# Patient Record
Sex: FEMALE | Race: WHITE | NOT HISPANIC OR LATINO | Employment: FULL TIME | URBAN - METROPOLITAN AREA
[De-identification: names, ages, dates, MRNs, and addresses within clinical notes are randomized per-mention and may not be internally consistent; named-entity substitution may affect disease eponyms.]

---

## 2018-04-03 ENCOUNTER — HOSPITAL ENCOUNTER (OUTPATIENT)
Facility: HOSPITAL | Age: 33
Discharge: HOME | DRG: 384 | End: 2018-04-05
Attending: EMERGENCY MEDICINE | Admitting: EMERGENCY MEDICINE
Payer: MEDICAID

## 2018-04-03 DIAGNOSIS — D72.829 LEUKOCYTOSIS, UNSPECIFIED TYPE: ICD-10-CM

## 2018-04-03 DIAGNOSIS — R11.2 NON-INTRACTABLE VOMITING WITH NAUSEA, UNSPECIFIED VOMITING TYPE: ICD-10-CM

## 2018-04-03 DIAGNOSIS — K25.9 MULTIPLE GASTRIC ULCERS: ICD-10-CM

## 2018-04-03 DIAGNOSIS — R93.3 ABNORMAL CT SCAN, GASTROINTESTINAL TRACT: ICD-10-CM

## 2018-04-03 DIAGNOSIS — R10.13 ACUTE EPIGASTRIC PAIN: Primary | ICD-10-CM

## 2018-04-03 LAB
ALBUMIN SERPL BCP-MCNC: 4.2 G/DL
ALP SERPL-CCNC: 96 U/L
ALT SERPL W/O P-5'-P-CCNC: 23 U/L
ANION GAP SERPL CALC-SCNC: 11 MMOL/L
AST SERPL-CCNC: 18 U/L
B-HCG UR QL: NEGATIVE
BACTERIA #/AREA URNS HPF: NORMAL /HPF
BASOPHILS # BLD AUTO: 0.02 K/UL
BASOPHILS # BLD AUTO: 0.03 K/UL
BASOPHILS NFR BLD: 0.1 %
BASOPHILS NFR BLD: 0.2 %
BILIRUB SERPL-MCNC: 0.9 MG/DL
BILIRUB UR QL STRIP: NEGATIVE
BUN SERPL-MCNC: 10 MG/DL
CALCIUM SERPL-MCNC: 9.8 MG/DL
CHLORIDE SERPL-SCNC: 100 MMOL/L
CLARITY UR: CLEAR
CO2 SERPL-SCNC: 27 MMOL/L
COLOR UR: YELLOW
CREAT SERPL-MCNC: 0.8 MG/DL
DIFFERENTIAL METHOD: ABNORMAL
DIFFERENTIAL METHOD: ABNORMAL
EOSINOPHIL # BLD AUTO: 0.1 K/UL
EOSINOPHIL # BLD AUTO: 0.1 K/UL
EOSINOPHIL NFR BLD: 0.9 %
EOSINOPHIL NFR BLD: 1 %
ERYTHROCYTE [DISTWIDTH] IN BLOOD BY AUTOMATED COUNT: 13.7 %
ERYTHROCYTE [DISTWIDTH] IN BLOOD BY AUTOMATED COUNT: 13.8 %
EST. GFR  (AFRICAN AMERICAN): >60 ML/MIN/1.73 M^2
EST. GFR  (NON AFRICAN AMERICAN): >60 ML/MIN/1.73 M^2
GLUCOSE SERPL-MCNC: 106 MG/DL
GLUCOSE UR QL STRIP: NEGATIVE
HCT VFR BLD AUTO: 46.2 %
HCT VFR BLD AUTO: 49.3 %
HGB BLD-MCNC: 15.9 G/DL
HGB BLD-MCNC: 17.5 G/DL
HGB UR QL STRIP: ABNORMAL
KETONES UR QL STRIP: NEGATIVE
LEUKOCYTE ESTERASE UR QL STRIP: NEGATIVE
LIPASE SERPL-CCNC: 11 U/L
LYMPHOCYTES # BLD AUTO: 1.9 K/UL
LYMPHOCYTES # BLD AUTO: 2.6 K/UL
LYMPHOCYTES NFR BLD: 14.2 %
LYMPHOCYTES NFR BLD: 16.7 %
MCH RBC QN AUTO: 31.7 PG
MCH RBC QN AUTO: 32.4 PG
MCHC RBC AUTO-ENTMCNC: 34.4 G/DL
MCHC RBC AUTO-ENTMCNC: 35.5 G/DL
MCV RBC AUTO: 91 FL
MCV RBC AUTO: 92 FL
MICROSCOPIC COMMENT: NORMAL
MONOCYTES # BLD AUTO: 0.8 K/UL
MONOCYTES # BLD AUTO: 1 K/UL
MONOCYTES NFR BLD: 6.1 %
MONOCYTES NFR BLD: 6.5 %
NEUTROPHILS # BLD AUTO: 10.6 K/UL
NEUTROPHILS # BLD AUTO: 11.8 K/UL
NEUTROPHILS NFR BLD: 75.7 %
NEUTROPHILS NFR BLD: 78.6 %
NITRITE UR QL STRIP: NEGATIVE
PH UR STRIP: 7 [PH] (ref 5–8)
PLATELET # BLD AUTO: 221 K/UL
PLATELET # BLD AUTO: 242 K/UL
PMV BLD AUTO: 11.2 FL
PMV BLD AUTO: 11.6 FL
POTASSIUM SERPL-SCNC: 3.5 MMOL/L
PROCALCITONIN SERPL IA-MCNC: 0.03 NG/ML
PROT SERPL-MCNC: 8 G/DL
PROT UR QL STRIP: NEGATIVE
RBC # BLD AUTO: 5.01 M/UL
RBC # BLD AUTO: 5.4 M/UL
RBC #/AREA URNS HPF: 4 /HPF (ref 0–4)
SODIUM SERPL-SCNC: 138 MMOL/L
SP GR UR STRIP: <=1.005 (ref 1–1.03)
SQUAMOUS #/AREA URNS HPF: 3 /HPF
URN SPEC COLLECT METH UR: ABNORMAL
UROBILINOGEN UR STRIP-ACNC: NEGATIVE EU/DL
WBC # BLD AUTO: 13.51 K/UL
WBC # BLD AUTO: 15.59 K/UL

## 2018-04-03 PROCEDURE — 63600175 PHARM REV CODE 636 W HCPCS: Performed by: PHYSICIAN ASSISTANT

## 2018-04-03 PROCEDURE — 25000003 PHARM REV CODE 250: Performed by: NURSE PRACTITIONER

## 2018-04-03 PROCEDURE — G0378 HOSPITAL OBSERVATION PER HR: HCPCS

## 2018-04-03 PROCEDURE — C9113 INJ PANTOPRAZOLE SODIUM, VIA: HCPCS | Performed by: PHYSICIAN ASSISTANT

## 2018-04-03 PROCEDURE — 25500020 PHARM REV CODE 255: Performed by: PHYSICIAN ASSISTANT

## 2018-04-03 PROCEDURE — 80053 COMPREHEN METABOLIC PANEL: CPT

## 2018-04-03 PROCEDURE — 96376 TX/PRO/DX INJ SAME DRUG ADON: CPT

## 2018-04-03 PROCEDURE — 83690 ASSAY OF LIPASE: CPT

## 2018-04-03 PROCEDURE — 85025 COMPLETE CBC W/AUTO DIFF WBC: CPT

## 2018-04-03 PROCEDURE — 84145 PROCALCITONIN (PCT): CPT

## 2018-04-03 PROCEDURE — 96374 THER/PROPH/DIAG INJ IV PUSH: CPT

## 2018-04-03 PROCEDURE — 25000003 PHARM REV CODE 250: Performed by: PHYSICIAN ASSISTANT

## 2018-04-03 PROCEDURE — 93005 ELECTROCARDIOGRAM TRACING: CPT

## 2018-04-03 PROCEDURE — S4991 NICOTINE PATCH NONLEGEND: HCPCS | Performed by: NURSE PRACTITIONER

## 2018-04-03 PROCEDURE — 93010 ELECTROCARDIOGRAM REPORT: CPT | Mod: ,,, | Performed by: INTERNAL MEDICINE

## 2018-04-03 PROCEDURE — 99285 EMERGENCY DEPT VISIT HI MDM: CPT

## 2018-04-03 PROCEDURE — 81025 URINE PREGNANCY TEST: CPT

## 2018-04-03 PROCEDURE — 81000 URINALYSIS NONAUTO W/SCOPE: CPT

## 2018-04-03 PROCEDURE — 96375 TX/PRO/DX INJ NEW DRUG ADDON: CPT

## 2018-04-03 RX ORDER — HYDROMORPHONE HYDROCHLORIDE 1 MG/ML
1 INJECTION, SOLUTION INTRAMUSCULAR; INTRAVENOUS; SUBCUTANEOUS EVERY 4 HOURS PRN
Status: CANCELLED | OUTPATIENT
Start: 2018-04-03

## 2018-04-03 RX ORDER — HYDROMORPHONE HYDROCHLORIDE 1 MG/ML
1 INJECTION, SOLUTION INTRAMUSCULAR; INTRAVENOUS; SUBCUTANEOUS
Status: COMPLETED | OUTPATIENT
Start: 2018-04-03 | End: 2018-04-03

## 2018-04-03 RX ORDER — ONDANSETRON 2 MG/ML
4 INJECTION INTRAMUSCULAR; INTRAVENOUS
Status: COMPLETED | OUTPATIENT
Start: 2018-04-03 | End: 2018-04-03

## 2018-04-03 RX ORDER — MORPHINE SULFATE 4 MG/ML
6 INJECTION, SOLUTION INTRAMUSCULAR; INTRAVENOUS
Status: COMPLETED | OUTPATIENT
Start: 2018-04-03 | End: 2018-04-03

## 2018-04-03 RX ORDER — ACETAMINOPHEN 325 MG/1
650 TABLET ORAL EVERY 6 HOURS PRN
Status: DISCONTINUED | OUTPATIENT
Start: 2018-04-03 | End: 2018-04-05 | Stop reason: HOSPADM

## 2018-04-03 RX ORDER — OXYCODONE HYDROCHLORIDE 5 MG/1
10 TABLET ORAL EVERY 6 HOURS PRN
Status: DISCONTINUED | OUTPATIENT
Start: 2018-04-03 | End: 2018-04-05 | Stop reason: HOSPADM

## 2018-04-03 RX ORDER — METOCLOPRAMIDE HYDROCHLORIDE 5 MG/ML
5 INJECTION INTRAMUSCULAR; INTRAVENOUS
Status: COMPLETED | OUTPATIENT
Start: 2018-04-03 | End: 2018-04-03

## 2018-04-03 RX ORDER — ONDANSETRON 2 MG/ML
4 INJECTION INTRAMUSCULAR; INTRAVENOUS EVERY 8 HOURS
Status: DISCONTINUED | OUTPATIENT
Start: 2018-04-03 | End: 2018-04-05 | Stop reason: HOSPADM

## 2018-04-03 RX ORDER — HYDROMORPHONE HYDROCHLORIDE 1 MG/ML
0.5 INJECTION, SOLUTION INTRAMUSCULAR; INTRAVENOUS; SUBCUTANEOUS EVERY 6 HOURS PRN
Status: DISCONTINUED | OUTPATIENT
Start: 2018-04-03 | End: 2018-04-04

## 2018-04-03 RX ORDER — PANTOPRAZOLE SODIUM 40 MG/10ML
40 INJECTION, POWDER, LYOPHILIZED, FOR SOLUTION INTRAVENOUS DAILY
Status: CANCELLED | OUTPATIENT
Start: 2018-04-04

## 2018-04-03 RX ORDER — IBUPROFEN 200 MG
1 TABLET ORAL DAILY
Status: DISCONTINUED | OUTPATIENT
Start: 2018-04-04 | End: 2018-04-03

## 2018-04-03 RX ORDER — ONDANSETRON 2 MG/ML
4 INJECTION INTRAMUSCULAR; INTRAVENOUS EVERY 8 HOURS PRN
Status: CANCELLED | OUTPATIENT
Start: 2018-04-03

## 2018-04-03 RX ORDER — IBUPROFEN 200 MG
1 TABLET ORAL DAILY
Status: DISCONTINUED | OUTPATIENT
Start: 2018-04-03 | End: 2018-04-05 | Stop reason: HOSPADM

## 2018-04-03 RX ORDER — SODIUM CHLORIDE 9 MG/ML
INJECTION, SOLUTION INTRAVENOUS CONTINUOUS
Status: DISCONTINUED | OUTPATIENT
Start: 2018-04-03 | End: 2018-04-05 | Stop reason: HOSPADM

## 2018-04-03 RX ADMIN — ONDANSETRON 4 MG: 2 INJECTION INTRAMUSCULAR; INTRAVENOUS at 10:04

## 2018-04-03 RX ADMIN — SODIUM CHLORIDE 1000 ML: 0.9 INJECTION, SOLUTION INTRAVENOUS at 12:04

## 2018-04-03 RX ADMIN — LIDOCAINE HYDROCHLORIDE 50 ML: 20 SOLUTION ORAL; TOPICAL at 02:04

## 2018-04-03 RX ADMIN — OXYCODONE HYDROCHLORIDE 10 MG: 5 TABLET ORAL at 07:04

## 2018-04-03 RX ADMIN — ONDANSETRON 4 MG: 2 INJECTION INTRAMUSCULAR; INTRAVENOUS at 08:04

## 2018-04-03 RX ADMIN — Medication 1 MG: at 05:04

## 2018-04-03 RX ADMIN — IOHEXOL 75 ML: 350 INJECTION, SOLUTION INTRAVENOUS at 03:04

## 2018-04-03 RX ADMIN — Medication 1 MG: at 12:04

## 2018-04-03 RX ADMIN — SODIUM CHLORIDE 1000 ML: 0.9 INJECTION, SOLUTION INTRAVENOUS at 10:04

## 2018-04-03 RX ADMIN — DEXTROSE MONOHYDRATE 40 MG: 5 INJECTION, SOLUTION INTRAVENOUS at 07:04

## 2018-04-03 RX ADMIN — SODIUM CHLORIDE: 0.9 INJECTION, SOLUTION INTRAVENOUS at 06:04

## 2018-04-03 RX ADMIN — METOCLOPRAMIDE 5 MG: 5 INJECTION, SOLUTION INTRAMUSCULAR; INTRAVENOUS at 05:04

## 2018-04-03 RX ADMIN — ONDANSETRON 4 MG: 2 INJECTION INTRAMUSCULAR; INTRAVENOUS at 12:04

## 2018-04-03 RX ADMIN — NICOTINE 1 PATCH: 21 PATCH, EXTENDED RELEASE TRANSDERMAL at 06:04

## 2018-04-03 RX ADMIN — MORPHINE SULFATE 6 MG: 4 INJECTION INTRAVENOUS at 10:04

## 2018-04-03 RX ADMIN — ONDANSETRON 4 MG: 2 INJECTION INTRAMUSCULAR; INTRAVENOUS at 05:04

## 2018-04-03 NOTE — HPI
Sarah Hernandez is a 32 year old female with complaints of a 3 day history of abdominal pain, nausea and vomiting. The patient describes the pain as severe. It is located in her epigastric region and improved with emesis. She also reports a small amount of watery diarrhea. She reports not being able to tolerate liquids or solids, but is drinking a Sprite at the time of exam. She denies fever, chills, heartburn, NSAID usage and history of gastritis. In the ED, the patient was found to have a WBC count of 15,590 and findings of significant mucosal thickening within the stomach at the pyloric region, measuring up to 1.7 cm with considerations including gastritis versus lymphoma or metastatic disease. Her metabolic panel was within normal limits.

## 2018-04-03 NOTE — ASSESSMENT & PLAN NOTE
-Possibly associated with anatomic abnormalities seen on CT scan causing outlet obstruction, but more likely associated with gastroenteritis or gastritis.   -IV Protonix.   -Supportive care.   -EGD tomorrow, per GI.

## 2018-04-03 NOTE — SUBJECTIVE & OBJECTIVE
History reviewed. No pertinent past medical history.    Past Surgical History:   Procedure Laterality Date    APPENDECTOMY      CHOLECYSTECTOMY      MANDIBLE SURGERY      SINUS SURGERY      TONSILLECTOMY         Review of patient's allergies indicates:   Allergen Reactions    Antihistamines - alkylamine Hives    Augmentin [amoxicillin-pot clavulanate] Nausea And Vomiting    Biaxin [clarithromycin] Nausea And Vomiting    Ceclor [cefaclor] Nausea And Vomiting    Ceftin [cefuroxime axetil] Nausea And Vomiting    Rocephin [ceftriaxone] Rash    Zithromax [azithromycin] Rash     Medications: None    Family History     Reviewed and not pertinent.         Social History Main Topics    Smoking status: Current Every Day Smoker    Smokeless tobacco: Not on file    Alcohol use Yes    Drug use: Unknown    Sexual activity: Not on file     Review of Systems   Constitutional: Negative for appetite change, chills, diaphoresis, fatigue and fever.   HENT: Negative for congestion, ear pain, mouth sores, sore throat and trouble swallowing.    Eyes: Negative for pain and visual disturbance.   Respiratory: Negative for cough, chest tightness and shortness of breath.    Cardiovascular: Negative for chest pain, palpitations and leg swelling.   Gastrointestinal: Positive for abdominal pain, nausea and vomiting. Negative for constipation.   Endocrine: Negative for cold intolerance, heat intolerance, polydipsia and polyuria.   Genitourinary: Negative for dysuria, frequency and hematuria.   Musculoskeletal: Negative for arthralgias, back pain, myalgias and neck pain.   Skin: Negative for pallor, rash and wound.   Allergic/Immunologic: Negative for environmental allergies and immunocompromised state.   Neurological: Negative for dizziness, seizures, syncope, weakness, numbness and headaches.   Hematological: Negative for adenopathy. Does not bruise/bleed easily.   Psychiatric/Behavioral: Negative for agitation, confusion and  sleep disturbance.     Objective:     Vital Signs (Most Recent):  Temp: 99.3 °F (37.4 °C) (04/03/18 1132)  Pulse: 84 (04/03/18 1132)  Resp: 18 (04/03/18 1132)  BP: 124/80 (04/03/18 1132)  SpO2: 96 % (04/03/18 1132) Vital Signs (24h Range):  Temp:  [99.3 °F (37.4 °C)-99.4 °F (37.4 °C)] 99.3 °F (37.4 °C)  Pulse:  [] 84  Resp:  [18] 18  SpO2:  [96 %-98 %] 96 %  BP: (124-182)/() 124/80     Weight: 104.9 kg (231 lb 6 oz)  Body mass index is 40.99 kg/m².    Physical Exam   Constitutional: She is oriented to person, place, and time. She appears well-developed and well-nourished. No distress.   HENT:   Head: Normocephalic and atraumatic.   Eyes: Conjunctivae are normal.   PERRL   Neck: Neck supple. No JVD present.   Cardiovascular: Normal rate, regular rhythm and normal heart sounds.    Pulmonary/Chest: Effort normal and breath sounds normal.   Abdominal: Soft. Bowel sounds are normal. She exhibits no distension. There is tenderness.   Musculoskeletal: Normal range of motion. She exhibits edema (trace bilateral lower extremity).   Neurological: She is alert and oriented to person, place, and time.   Skin: Skin is warm and dry.   Psychiatric: She has a normal mood and affect. Her behavior is normal. Thought content normal.   Nursing note and vitals reviewed.          Significant Labs:   CBC:   Recent Labs  Lab 04/03/18  1029 04/03/18  1409   WBC 13.51* 15.59*   HGB 17.5* 15.9   HCT 49.3* 46.2    221     CMP:   Recent Labs  Lab 04/03/18  1029      K 3.5      CO2 27      BUN 10   CREATININE 0.8   CALCIUM 9.8   PROT 8.0   ALBUMIN 4.2   BILITOT 0.9   ALKPHOS 96   AST 18   ALT 23   ANIONGAP 11   EGFRNONAA >60     All pertinent labs within the past 24 hours have been reviewed.    Significant Imaging: I have reviewed all pertinent imaging results/findings within the past 24 hours.\  Imaging Results          CT Abdomen Pelvis With Contrast (Final result)     Abnormal  Result time 04/03/18  15:19:12    Final result by Frankie Carlos MD (04/03/18 15:19:12)                 Impression:        Significant mucosal thickening is seen within the stomach within the pyloric region measuring up to 1.7 cm. Considerations include gastritis versus lymphoma or metastatic disease. Recommend followup.    Fluid-filled loops of small bowel are seen within the left upper quadrant with mucosal hyperemia and thickening which can be seen with focal enteritis.    All CT scans at this facility use dose modulation, iterative reconstruction and/or weight based dosing when appropriate to reduce radiation dose to as low as reasonably achievable.      Electronically signed by: FRANKIE CARLOS MD  Date:     04/03/18  Time:    15:19              Narrative:    Clinical data: Abdominal pain.    Axial images through the abdomen and pelvis were obtained  after administration of 75 cc of omni-350 contrast. Coronal and sagittal reformatted images were also submitted for interpretation.    Findings:    The visualized portion of the heart is normal.  No pericardial effusion. The lung bases are clear with mild dependent changes.  No pleural effusion.    The liver,  biliary system, pancreas,  spleen, adrenal glands are normal. The gallbladder is surgically absent. The aorta demonstrates no significant atherosclerotic plaque.No lymphadenopathy.    The kidneys demonstrate normal size, position, contrast excretion with no focal abnormalities or hydronephrosis.The bladder is unremarkable. IUD is seen within the uterus. Probably prominent left ovarian vein with pelvic varices consistent with ovarian vein incompetence.    Significant mucosal thickening is seen within the stomach within the pyloric region measuring up to 1.7 cm. Considerations include gastritis versus lymphoma or metastatic disease. Fluid-filled loops of small bowel are seen within the left upper quadrant with mucosal hyperemia and thickening which can be seen with focal enteritis.  The colon is unremarkable. No evidence of acute appendicitis..    Bones appear intact .                             X-Ray Abdomen Flat And Erect (Final result)  Result time 04/03/18 12:42:19    Final result by MATILDA Villavicencio Sr., MD (04/03/18 12:42:19)                 Impression:      1. The bowel gas pattern is normal in appearance.  2. There is a mild amount of levoconvex curvature of the lumbar spine.   3. There are surgical clips projected over the right upper quadrant of the abdomen. This is characteristic of a prior cholecystectomy.  4. A T-type intrauterine device is projected over the pelvis.        Electronically signed by: MATILDA VILLAVICENCIO MD  Date:     04/03/18  Time:    12:42              Narrative:    Flat and erect KUB    History: Abdominal pain    Finding: The bowel gas pattern is normal in appearance. There is no pneumoperitoneum. There is a mild amount of levoconvex curvature of the lumbar spine. There are surgical clips projected over the right upper quadrant of the abdomen. A T-type intrauterine device is projected over the pelvis.

## 2018-04-03 NOTE — PROGRESS NOTES
"Pt arrived to the floor.Transferred to bed .Patient is aaox4. No signs of acute distress noted. Report received from [facility]. /83 (BP Location: Right arm, Patient Position: Lying)   Pulse 82   Temp 99 °F (37.2 °C) (Oral)   Resp 17   Ht 5' 3" (1.6 m)   Wt 104.9 kg (231 lb 6 oz)   SpO2 (!) 94%   BMI 40.99 kg/m²    Patient has been orientated to room, call light, fall precautions, rounding sheet, menu, and board. Heart monitor in place, no questions or concerns at this time.   "

## 2018-04-03 NOTE — ED PROVIDER NOTES
Encounter Date: 4/3/2018       History     Chief Complaint   Patient presents with    Abdominal Pain     burning/stabbing to epigastric area. Emesis since sunday, decreased urination.      The history is provided by the patient.   Abdominal Pain   The current episode started two days ago. The onset of the illness was abrupt. The problem has not changed since onset.The abdominal pain is located in the epigastric region. The abdominal pain does not radiate. The severity of the abdominal pain is 4/10. The abdominal pain is relieved by nothing. The abdominal pain is exacerbated by vomiting. The other symptoms of the illness include nausea and vomiting. The other symptoms of the illness do not include fever, fatigue, jaundice, melena, shortness of breath, diarrhea, dysuria, hematemesis, hematochezia, vaginal discharge or vaginal bleeding.   The emesis contains bilious material.   The patient states that she believes she is currently not pregnant. The patient has not had a change in bowel habit. Additional symptoms associated with the illness include anorexia and heartburn. Symptoms associated with the illness do not include chills, diaphoresis, constipation, urgency, hematuria, frequency or back pain.     Review of patient's allergies indicates:   Allergen Reactions    Antihistamines - alkylamine Hives    Augmentin [amoxicillin-pot clavulanate] Nausea And Vomiting    Biaxin [clarithromycin] Nausea And Vomiting    Ceclor [cefaclor] Nausea And Vomiting    Ceftin [cefuroxime axetil] Nausea And Vomiting    Rocephin [ceftriaxone] Rash    Zithromax [azithromycin] Rash     History reviewed. No pertinent past medical history.  Past Surgical History:   Procedure Laterality Date    APPENDECTOMY      CHOLECYSTECTOMY      MANDIBLE SURGERY      SINUS SURGERY      TONSILLECTOMY       History reviewed. No pertinent family history.  Social History   Substance Use Topics    Smoking status: Current Every Day Smoker     Smokeless tobacco: Not given    Alcohol use Yes     Review of Systems   Constitutional: Negative for chills, diaphoresis, fatigue and fever.   HENT: Negative for sore throat.    Eyes: Negative for photophobia and redness.   Respiratory: Negative for cough and shortness of breath.    Cardiovascular: Negative for chest pain.   Gastrointestinal: Positive for abdominal pain, anorexia, heartburn, nausea and vomiting. Negative for constipation, diarrhea, hematemesis, hematochezia, jaundice and melena.   Endocrine: Negative for polyphagia and polyuria.   Genitourinary: Negative for dysuria, frequency, hematuria, urgency, vaginal bleeding and vaginal discharge.   Musculoskeletal: Negative for arthralgias, back pain and myalgias.   Skin: Negative for rash.   Neurological: Negative for weakness and headaches.   Hematological: Does not bruise/bleed easily.   Psychiatric/Behavioral: The patient is not nervous/anxious.    All other systems reviewed and are negative.      Physical Exam     Initial Vitals [04/03/18 1006]   BP Pulse Resp Temp SpO2   (!) 182/101 (!) 111 18 99.4 °F (37.4 °C) 98 %      MAP       128         Physical Exam    Nursing note and vitals reviewed.  Constitutional: Vital signs are normal. She appears well-developed and well-nourished. She appears distressed (secondary to pain).   HENT:   Head: Normocephalic and atraumatic.   Right Ear: External ear normal.   Left Ear: External ear normal.   Nose: Nose normal.   Mouth/Throat: Oropharynx is clear and moist.   Eyes: Conjunctivae, EOM and lids are normal. Pupils are equal, round, and reactive to light.   Neck: Normal range of motion and full passive range of motion without pain. Neck supple.   Cardiovascular: Normal rate, regular rhythm, S1 normal, S2 normal, normal heart sounds, intact distal pulses and normal pulses.   Pulmonary/Chest: Breath sounds normal. No respiratory distress. She has no wheezes. She has no rales.   Abdominal: Soft. Normal appearance and  bowel sounds are normal. She exhibits no distension. There is no tenderness.   Musculoskeletal: Normal range of motion.   Lymphadenopathy:     She has no cervical adenopathy.   Neurological: She is alert and oriented to person, place, and time. She has normal strength. No cranial nerve deficit or sensory deficit. Coordination and gait normal.   Skin: Skin is warm, dry and intact.   Psychiatric: She has a normal mood and affect. Her speech is normal and behavior is normal. Judgment and thought content normal. Cognition and memory are normal.         ED Course   Procedures  Labs Reviewed   CBC W/ AUTO DIFFERENTIAL - Abnormal; Notable for the following:        Result Value    WBC 13.51 (*)     Hemoglobin 17.5 (*)     Hematocrit 49.3 (*)     MCH 32.4 (*)     Gran # (ANC) 10.6 (*)     Gran% 78.6 (*)     Lymph% 14.2 (*)     All other components within normal limits   URINALYSIS - Abnormal; Notable for the following:     Specific Gravity, UA <=1.005 (*)     Occult Blood UA 1+ (*)     All other components within normal limits   CBC W/ AUTO DIFFERENTIAL - Abnormal; Notable for the following:     WBC 15.59 (*)     MCH 31.7 (*)     Gran # (ANC) 11.8 (*)     Gran% 75.7 (*)     Lymph% 16.7 (*)     All other components within normal limits    Narrative:     Draw after 2nd bolus is complete   COMPREHENSIVE METABOLIC PANEL   LIPASE   PREGNANCY TEST, URINE RAPID   URINALYSIS MICROSCOPIC   PROCALCITONIN     EKG Readings: (Independently Interpreted)   Initial Reading: No STEMI. Rhythm: Normal Sinus Rhythm. Ectopy: No Ectopy. Conduction: Normal. ST Segments: Normal ST Segments. T Waves: Normal. Clinical Impression: Normal Sinus Rhythm     3:40 PM: ALY Lopez discussed the pt's case with Dr. Narayanan (GI) who recommends admitting patient for further evaluation.    3:49 PM: Discussed case with ALY Moss (Hospital Medicine). Dr. Camacho agrees with current care and management of pt and accepts admission.   Admitting  Service: Hospital medicine   Admitting Physician: Dr. Camacho  Admit to: Obs/Tele    3:52 PM: Re-evaluated pt. I have discussed test results, shared treatment plan, and the need for admission with patient and family at bedside. Pt and family express understanding at this time and agree with all information. All questions answered. Pt and family have no further questions or concerns at this time. Pt is ready for admit.           Medical Decision Making:   Clinical Tests:   Lab Tests: Ordered and Reviewed  Radiological Study: Ordered and Reviewed  Medical Tests: Ordered and Reviewed                   ED Course as of Apr 03 1601   Tue Apr 03, 2018   1117 Hemoglobin: (!) 17.5 [DP]   1553 Appearance, UA: Clear [DP]      ED Course User Index  [DP] ALY Jackson     Clinical Impression:   The primary encounter diagnosis was Acute epigastric pain. A diagnosis of Leukocytosis, unspecified type was also pertinent to this visit.    Disposition:   Disposition: Admitted  Condition: Fair                        ALY Jackson  04/03/18 1601

## 2018-04-03 NOTE — H&P
[facility] Hospital Medicine  History & Physical    Patient Name: Sarah Hernandez  MRN: [id number]  Admission Date: 4/3/2018  Attending Physician: Kaitlynn Camacho MD   Primary Care Provider: Primary Doctor No         Patient information was obtained from patient, past medical records and ER records.     Subjective:     Principal Problem:Abnormal CT scan, gastrointestinal tract    Chief Complaint:   Chief Complaint   Patient presents with    Abdominal Pain     burning/stabbing to epigastric area. Emesis since sunday, decreased urination.         HPI: Sarah Hernandez is a 32 year old female with complaints of a 3 day history of abdominal pain, nausea and vomiting. The patient describes the pain as severe. It is located in her epigastric region and improved with emesis. She also reports a small amount of watery diarrhea. She reports not being able to tolerate liquids or solids, but is drinking a Sprite at the time of exam. She denies fever, chills, heartburn, NSAID usage and history of gastritis. In the ED, the patient was found to have a WBC count of 15,590 and findings of significant mucosal thickening within the stomach at the pyloric region, measuring up to 1.7 cm with considerations including gastritis versus lymphoma or metastatic disease. Her metabolic panel was within normal limits.     History reviewed. No pertinent past medical history.    Past Surgical History:   Procedure Laterality Date    APPENDECTOMY      CHOLECYSTECTOMY      MANDIBLE SURGERY      SINUS SURGERY      TONSILLECTOMY         Review of patient's allergies indicates:   Allergen Reactions    Antihistamines - alkylamine Hives    Augmentin [amoxicillin-pot clavulanate] Nausea And Vomiting    Biaxin [clarithromycin] Nausea And Vomiting    Ceclor [cefaclor] Nausea And Vomiting    Ceftin [cefuroxime axetil] Nausea And Vomiting    Rocephin [ceftriaxone] Rash    Zithromax [azithromycin] Rash     Medications: None    Family  History     Reviewed and not pertinent.         Social History Main Topics    Smoking status: Current Every Day Smoker    Smokeless tobacco: Not on file    Alcohol use Yes    Drug use: Unknown    Sexual activity: Not on file     Review of Systems   Constitutional: Negative for appetite change, chills, diaphoresis, fatigue and fever.   HENT: Negative for congestion, ear pain, mouth sores, sore throat and trouble swallowing.    Eyes: Negative for pain and visual disturbance.   Respiratory: Negative for cough, chest tightness and shortness of breath.    Cardiovascular: Negative for chest pain, palpitations and leg swelling.   Gastrointestinal: Positive for abdominal pain, nausea and vomiting. Negative for constipation.   Endocrine: Negative for cold intolerance, heat intolerance, polydipsia and polyuria.   Genitourinary: Negative for dysuria, frequency and hematuria.   Musculoskeletal: Negative for arthralgias, back pain, myalgias and neck pain.   Skin: Negative for pallor, rash and wound.   Allergic/Immunologic: Negative for environmental allergies and immunocompromised state.   Neurological: Negative for dizziness, seizures, syncope, weakness, numbness and headaches.   Hematological: Negative for adenopathy. Does not bruise/bleed easily.   Psychiatric/Behavioral: Negative for agitation, confusion and sleep disturbance.     Objective:     Vital Signs (Most Recent):  Temp: 99.3 °F (37.4 °C) (04/03/18 1132)  Pulse: 84 (04/03/18 1132)  Resp: 18 (04/03/18 1132)  BP: 124/80 (04/03/18 1132)  SpO2: 96 % (04/03/18 1132) Vital Signs (24h Range):  Temp:  [99.3 °F (37.4 °C)-99.4 °F (37.4 °C)] 99.3 °F (37.4 °C)  Pulse:  [] 84  Resp:  [18] 18  SpO2:  [96 %-98 %] 96 %  BP: (124-182)/() 124/80     Weight: 104.9 kg (231 lb 6 oz)  Body mass index is 40.99 kg/m².    Physical Exam   Constitutional: She is oriented to person, place, and time. She appears well-developed and well-nourished. No distress.   HENT:   Head:  Normocephalic and atraumatic.   Eyes: Conjunctivae are normal.   PERRL   Neck: Neck supple. No JVD present.   Cardiovascular: Normal rate, regular rhythm and normal heart sounds.    Pulmonary/Chest: Effort normal and breath sounds normal.   Abdominal: Soft. Bowel sounds are normal. She exhibits no distension. There is tenderness.   Musculoskeletal: Normal range of motion. She exhibits edema (trace bilateral lower extremity).   Neurological: She is alert and oriented to person, place, and time.   Skin: Skin is warm and dry.   Psychiatric: She has a normal mood and affect. Her behavior is normal. Thought content normal.   Nursing note and vitals reviewed.          Significant Labs:   CBC:   Recent Labs  Lab 04/03/18  1029 04/03/18  1409   WBC 13.51* 15.59*   HGB 17.5* 15.9   HCT 49.3* 46.2    221     CMP:   Recent Labs  Lab 04/03/18  1029      K 3.5      CO2 27      BUN 10   CREATININE 0.8   CALCIUM 9.8   PROT 8.0   ALBUMIN 4.2   BILITOT 0.9   ALKPHOS 96   AST 18   ALT 23   ANIONGAP 11   EGFRNONAA >60     All pertinent labs within the past 24 hours have been reviewed.    Significant Imaging: I have reviewed all pertinent imaging results/findings within the past 24 hours.\  Imaging Results          CT Abdomen Pelvis With Contrast (Final result)     Abnormal  Result time 04/03/18 15:19:12    Final result by Frankie Jeronimo MD (04/03/18 15:19:12)                 Impression:        Significant mucosal thickening is seen within the stomach within the pyloric region measuring up to 1.7 cm. Considerations include gastritis versus lymphoma or metastatic disease. Recommend followup.    Fluid-filled loops of small bowel are seen within the left upper quadrant with mucosal hyperemia and thickening which can be seen with focal enteritis.    All CT scans at this facility use dose modulation, iterative reconstruction and/or weight based dosing when appropriate to reduce radiation dose to as low as  reasonably achievable.      Electronically signed by: AXEL CARLOS MD  Date:     04/03/18  Time:    15:19              Narrative:    Clinical data: Abdominal pain.    Axial images through the abdomen and pelvis were obtained  after administration of 75 cc of omni-350 contrast. Coronal and sagittal reformatted images were also submitted for interpretation.    Findings:    The visualized portion of the heart is normal.  No pericardial effusion. The lung bases are clear with mild dependent changes.  No pleural effusion.    The liver,  biliary system, pancreas,  spleen, adrenal glands are normal. The gallbladder is surgically absent. The aorta demonstrates no significant atherosclerotic plaque.No lymphadenopathy.    The kidneys demonstrate normal size, position, contrast excretion with no focal abnormalities or hydronephrosis.The bladder is unremarkable. IUD is seen within the uterus. Probably prominent left ovarian vein with pelvic varices consistent with ovarian vein incompetence.    Significant mucosal thickening is seen within the stomach within the pyloric region measuring up to 1.7 cm. Considerations include gastritis versus lymphoma or metastatic disease. Fluid-filled loops of small bowel are seen within the left upper quadrant with mucosal hyperemia and thickening which can be seen with focal enteritis. The colon is unremarkable. No evidence of acute appendicitis..    Bones appear intact .                             X-Ray Abdomen Flat And Erect (Final result)  Result time 04/03/18 12:42:19    Final result by MATILDA Villavicencio Sr., MD (04/03/18 12:42:19)                 Impression:      1. The bowel gas pattern is normal in appearance.  2. There is a mild amount of levoconvex curvature of the lumbar spine.   3. There are surgical clips projected over the right upper quadrant of the abdomen. This is characteristic of a prior cholecystectomy.  4. A T-type intrauterine device is projected over the  pelvis.        Electronically signed by: MATILDA CONRAD MD  Date:     04/03/18  Time:    12:42              Narrative:    Flat and erect KUB    History: Abdominal pain    Finding: The bowel gas pattern is normal in appearance. There is no pneumoperitoneum. There is a mild amount of levoconvex curvature of the lumbar spine. There are surgical clips projected over the right upper quadrant of the abdomen. A T-type intrauterine device is projected over the pelvis.                                Assessment/Plan:     * Abnormal CT scan, gastrointestinal tract    Per GI, EGD tomorrow to further evaluate.           Leukocytosis    -Most likely due to hemodilution, but infectious gastroenteritis should be considered.   -Follow up procalcitonin level.   -Monitor.         Abdominal pain, nausea with vomiting    -Possibly associated with anatomic abnormalities seen on CT scan causing outlet obstruction, but more likely associated with gastroenteritis or gastritis.   -IV Protonix.   -Supportive care.   -EGD tomorrow, per GI.              VTE Risk Mitigation         Ordered     Place sequential compression device  Until discontinued      04/03/18 4186             ALY Alcaraz  Department of Hospital Medicine   [facility]

## 2018-04-03 NOTE — ED NOTES
"Pt has returned from Radiology. States her pain "is back, never really went away and nausea was better just for a little while. Also nauseated again." Art LU made aware  "

## 2018-04-04 ENCOUNTER — ANESTHESIA EVENT (OUTPATIENT)
Dept: ENDOSCOPY | Facility: HOSPITAL | Age: 33
DRG: 384 | End: 2018-04-04
Payer: MEDICAID

## 2018-04-04 ENCOUNTER — ANESTHESIA (OUTPATIENT)
Dept: ENDOSCOPY | Facility: HOSPITAL | Age: 33
DRG: 384 | End: 2018-04-04
Payer: MEDICAID

## 2018-04-04 PROBLEM — K25.9 MULTIPLE GASTRIC ULCERS: Status: ACTIVE | Noted: 2018-04-04

## 2018-04-04 PROBLEM — E66.01 MORBID OBESITY: Status: ACTIVE | Noted: 2018-04-04

## 2018-04-04 LAB
ANION GAP SERPL CALC-SCNC: 7 MMOL/L
BASOPHILS # BLD AUTO: 0.04 K/UL
BASOPHILS NFR BLD: 0.4 %
BUN SERPL-MCNC: 6 MG/DL
CALCIUM SERPL-MCNC: 8.5 MG/DL
CHLORIDE SERPL-SCNC: 106 MMOL/L
CO2 SERPL-SCNC: 26 MMOL/L
CREAT SERPL-MCNC: 0.8 MG/DL
DIFFERENTIAL METHOD: ABNORMAL
EOSINOPHIL # BLD AUTO: 0.1 K/UL
EOSINOPHIL NFR BLD: 1.3 %
ERYTHROCYTE [DISTWIDTH] IN BLOOD BY AUTOMATED COUNT: 14.1 %
EST. GFR  (AFRICAN AMERICAN): >60 ML/MIN/1.73 M^2
EST. GFR  (NON AFRICAN AMERICAN): >60 ML/MIN/1.73 M^2
GLUCOSE SERPL-MCNC: 99 MG/DL
HCT VFR BLD AUTO: 43.1 %
HGB BLD-MCNC: 14.4 G/DL
LYMPHOCYTES # BLD AUTO: 1.4 K/UL
LYMPHOCYTES NFR BLD: 12.6 %
MAGNESIUM SERPL-MCNC: 2.2 MG/DL
MCH RBC QN AUTO: 31 PG
MCHC RBC AUTO-ENTMCNC: 33.4 G/DL
MCV RBC AUTO: 93 FL
MONOCYTES # BLD AUTO: 1 K/UL
MONOCYTES NFR BLD: 9.4 %
NEUTROPHILS # BLD AUTO: 8.3 K/UL
NEUTROPHILS NFR BLD: 76.3 %
PHOSPHATE SERPL-MCNC: 2.6 MG/DL
PLATELET # BLD AUTO: 195 K/UL
PMV BLD AUTO: 10.9 FL
POTASSIUM SERPL-SCNC: 3.9 MMOL/L
RBC # BLD AUTO: 4.65 M/UL
SODIUM SERPL-SCNC: 139 MMOL/L
WBC # BLD AUTO: 10.91 K/UL

## 2018-04-04 PROCEDURE — 25000003 PHARM REV CODE 250: Performed by: PHYSICIAN ASSISTANT

## 2018-04-04 PROCEDURE — 99233 SBSQ HOSP IP/OBS HIGH 50: CPT | Mod: 25,,, | Performed by: INTERNAL MEDICINE

## 2018-04-04 PROCEDURE — 37000009 HC ANESTHESIA EA ADD 15 MINS: Performed by: INTERNAL MEDICINE

## 2018-04-04 PROCEDURE — 80048 BASIC METABOLIC PNL TOTAL CA: CPT

## 2018-04-04 PROCEDURE — 36415 COLL VENOUS BLD VENIPUNCTURE: CPT

## 2018-04-04 PROCEDURE — 37000008 HC ANESTHESIA 1ST 15 MINUTES: Performed by: INTERNAL MEDICINE

## 2018-04-04 PROCEDURE — 88342 IMHCHEM/IMCYTCHM 1ST ANTB: CPT | Mod: 26,,, | Performed by: PATHOLOGY

## 2018-04-04 PROCEDURE — 84100 ASSAY OF PHOSPHORUS: CPT

## 2018-04-04 PROCEDURE — 63600175 PHARM REV CODE 636 W HCPCS: Performed by: NURSE PRACTITIONER

## 2018-04-04 PROCEDURE — 88305 TISSUE EXAM BY PATHOLOGIST: CPT | Performed by: PATHOLOGY

## 2018-04-04 PROCEDURE — 88341 IMHCHEM/IMCYTCHM EA ADD ANTB: CPT | Mod: 26,,, | Performed by: PATHOLOGY

## 2018-04-04 PROCEDURE — 43239 EGD BIOPSY SINGLE/MULTIPLE: CPT | Performed by: INTERNAL MEDICINE

## 2018-04-04 PROCEDURE — 21400001 HC TELEMETRY ROOM

## 2018-04-04 PROCEDURE — S4991 NICOTINE PATCH NONLEGEND: HCPCS | Performed by: NURSE PRACTITIONER

## 2018-04-04 PROCEDURE — 25000003 PHARM REV CODE 250: Performed by: INTERNAL MEDICINE

## 2018-04-04 PROCEDURE — 83735 ASSAY OF MAGNESIUM: CPT

## 2018-04-04 PROCEDURE — 85025 COMPLETE CBC W/AUTO DIFF WBC: CPT

## 2018-04-04 PROCEDURE — 00731 ANES UPR GI NDSC PX NOS: CPT | Performed by: INTERNAL MEDICINE

## 2018-04-04 PROCEDURE — 25000003 PHARM REV CODE 250: Performed by: NURSE PRACTITIONER

## 2018-04-04 PROCEDURE — 43239 EGD BIOPSY SINGLE/MULTIPLE: CPT | Mod: ,,, | Performed by: INTERNAL MEDICINE

## 2018-04-04 PROCEDURE — 63600175 PHARM REV CODE 636 W HCPCS: Performed by: PHYSICIAN ASSISTANT

## 2018-04-04 PROCEDURE — 63600175 PHARM REV CODE 636 W HCPCS: Performed by: NURSE ANESTHETIST, CERTIFIED REGISTERED

## 2018-04-04 PROCEDURE — G0378 HOSPITAL OBSERVATION PER HR: HCPCS

## 2018-04-04 PROCEDURE — 27201012 HC FORCEPS, HOT/COLD, DISP: Performed by: INTERNAL MEDICINE

## 2018-04-04 PROCEDURE — 88305 TISSUE EXAM BY PATHOLOGIST: CPT | Mod: 26,,, | Performed by: PATHOLOGY

## 2018-04-04 PROCEDURE — 25000003 PHARM REV CODE 250: Performed by: NURSE ANESTHETIST, CERTIFIED REGISTERED

## 2018-04-04 PROCEDURE — 88342 IMHCHEM/IMCYTCHM 1ST ANTB: CPT | Performed by: PATHOLOGY

## 2018-04-04 PROCEDURE — C9113 INJ PANTOPRAZOLE SODIUM, VIA: HCPCS | Performed by: PHYSICIAN ASSISTANT

## 2018-04-04 RX ORDER — SODIUM CHLORIDE, SODIUM LACTATE, POTASSIUM CHLORIDE, CALCIUM CHLORIDE 600; 310; 30; 20 MG/100ML; MG/100ML; MG/100ML; MG/100ML
INJECTION, SOLUTION INTRAVENOUS ONCE
Status: COMPLETED | OUTPATIENT
Start: 2018-04-04 | End: 2018-04-04

## 2018-04-04 RX ORDER — METOCLOPRAMIDE HYDROCHLORIDE 5 MG/ML
5 INJECTION INTRAMUSCULAR; INTRAVENOUS ONCE
Status: COMPLETED | OUTPATIENT
Start: 2018-04-04 | End: 2018-04-04

## 2018-04-04 RX ORDER — SUCRALFATE 1 G/10ML
1 SUSPENSION ORAL EVERY 6 HOURS
Status: DISCONTINUED | OUTPATIENT
Start: 2018-04-04 | End: 2018-04-05 | Stop reason: HOSPADM

## 2018-04-04 RX ORDER — PROPOFOL 10 MG/ML
VIAL (ML) INTRAVENOUS
Status: DISCONTINUED | OUTPATIENT
Start: 2018-04-04 | End: 2018-04-04

## 2018-04-04 RX ORDER — HYDROMORPHONE HYDROCHLORIDE 1 MG/ML
0.5 INJECTION, SOLUTION INTRAMUSCULAR; INTRAVENOUS; SUBCUTANEOUS EVERY 6 HOURS PRN
Status: DISCONTINUED | OUTPATIENT
Start: 2018-04-04 | End: 2018-04-05 | Stop reason: HOSPADM

## 2018-04-04 RX ORDER — ENOXAPARIN SODIUM 100 MG/ML
40 INJECTION SUBCUTANEOUS EVERY 24 HOURS
Status: DISCONTINUED | OUTPATIENT
Start: 2018-04-04 | End: 2018-04-05 | Stop reason: HOSPADM

## 2018-04-04 RX ORDER — LIDOCAINE HYDROCHLORIDE 10 MG/ML
INJECTION INFILTRATION; PERINEURAL
Status: DISCONTINUED | OUTPATIENT
Start: 2018-04-04 | End: 2018-04-04

## 2018-04-04 RX ADMIN — SUCRALFATE 1 G: 1 SUSPENSION ORAL at 04:04

## 2018-04-04 RX ADMIN — PROPOFOL 50 MG: 10 INJECTION, EMULSION INTRAVENOUS at 03:04

## 2018-04-04 RX ADMIN — OXYCODONE HYDROCHLORIDE 10 MG: 5 TABLET ORAL at 02:04

## 2018-04-04 RX ADMIN — ENOXAPARIN SODIUM 40 MG: 100 INJECTION SUBCUTANEOUS at 06:04

## 2018-04-04 RX ADMIN — OXYCODONE HYDROCHLORIDE 10 MG: 5 TABLET ORAL at 08:04

## 2018-04-04 RX ADMIN — NICOTINE 1 PATCH: 21 PATCH, EXTENDED RELEASE TRANSDERMAL at 06:04

## 2018-04-04 RX ADMIN — SODIUM CHLORIDE, SODIUM LACTATE, POTASSIUM CHLORIDE, AND CALCIUM CHLORIDE: 600; 310; 30; 20 INJECTION, SOLUTION INTRAVENOUS at 03:04

## 2018-04-04 RX ADMIN — HYDROMORPHONE HYDROCHLORIDE 0.5 MG: 1 INJECTION, SOLUTION INTRAMUSCULAR; INTRAVENOUS; SUBCUTANEOUS at 04:04

## 2018-04-04 RX ADMIN — LIDOCAINE HYDROCHLORIDE 50 MG: 10 INJECTION, SOLUTION INFILTRATION; PERINEURAL at 03:04

## 2018-04-04 RX ADMIN — SODIUM CHLORIDE: 0.9 INJECTION, SOLUTION INTRAVENOUS at 12:04

## 2018-04-04 RX ADMIN — DEXTROSE MONOHYDRATE 40 MG: 5 INJECTION, SOLUTION INTRAVENOUS at 08:04

## 2018-04-04 RX ADMIN — METOCLOPRAMIDE 5 MG: 5 INJECTION, SOLUTION INTRAMUSCULAR; INTRAVENOUS at 04:04

## 2018-04-04 RX ADMIN — ONDANSETRON 4 MG: 2 INJECTION INTRAMUSCULAR; INTRAVENOUS at 01:04

## 2018-04-04 RX ADMIN — HYDROMORPHONE HYDROCHLORIDE 0.5 MG: 1 INJECTION, SOLUTION INTRAMUSCULAR; INTRAVENOUS; SUBCUTANEOUS at 03:04

## 2018-04-04 RX ADMIN — ONDANSETRON 4 MG: 2 INJECTION INTRAMUSCULAR; INTRAVENOUS at 08:04

## 2018-04-04 RX ADMIN — ONDANSETRON 4 MG: 2 INJECTION INTRAMUSCULAR; INTRAVENOUS at 05:04

## 2018-04-04 NOTE — DISCHARGE INSTRUCTIONS
Understanding Gastric Ulcers    A gastric ulcer is an open sore in the stomach lining. It is sometimes called a peptic ulcer. This is a more general term for ulcers that may be in the stomach or the upper part of the small intestine. Ulcers can cause pain. But they may also have no symptoms for a long time.  What causes gastric ulcers?  Gastric ulcers have a few common causes. To find the cause of your ulcer, your healthcare provider will give you an exam and take your health history. He or she may also order some tests. The main causes of gastric ulcers include:  · Infection with the H. pylori (Helicobacter pylori) bacteria. This damages the stomach lining. Digestive juices can then harm the digestive tract.  · Long-term use of some over-the-counter pain medicines. This reduces the bodys ability to protect the stomach from damage.  Other causes of gastric ulcers include:  · Heavy alcohol use  · Having a family history of ulcers  · In rare cases, a tumor in the digestive tract may cause an ulcer  Symptoms of a gastric ulcer  Ulcer symptoms may appear and then go away for a time. Symptoms of a gastric ulcer may include:  · Stomach pain, often a dull or burning feeling toward the top of your belly  · Feeling full or bloated  · Heartburn or acid reflux  · Upset stomach (nausea) or vomiting  · Vomiting blood  · Lack of appetite  · Weight loss  · Black stool  · Red blood in the stool  Treatment for a gastric ulcer  Treatment for gastric ulcers may depend on what is causing them. Treatment may include:  · Not using over-the-counter medicines. You will likely need to stop taking these medicines. But in some cases these medicines cant be safely stopped. Check with your healthcare provider to see what is best for you.   · Taking medicines to ease symptoms. These medicines may help to reduce the amount of acid your stomach makes. They also may help coat your stomach lining.  · Taking antibiotics. If your ulcer was caused  by H. pylori, your provider will likely prescribe antibiotics to get rid of the infection.  · Having an endoscopy. This is often done to check the stomach and diagnose the ulcer. In some cases it can also treat the ulcer. It involves passing a flexible tube through your mouth into your stomach and small intestine.  · Using a tube (catheter) to stop the bleeding. A thin tube is passed into one of your blood vessels. Special tools are used to help stop the bleeding.  · Having surgery. You often may need this if the ulcer has caused severe symptoms.  Making some lifestyle changes can reduce ulcer symptoms. It may also prevent more damage to your digestive tract. These changes include:  · Not taking over-the-counter pain medicines. Talk with your provider about using another type of pain reliever.  · Not taking aspirin unless your provider has advised it  · Limiting the amount of alcohol you drink  · Quitting smoking  Possible complications of a gastric ulcer  Gastric ulcers can have serious complications. These can include:  · Bleeding into the stomach  · A hole (perforation) in the stomach  · A blockage that interferes with food moving from your stomach to the small intestine  An ongoing infection with H. pylori may be a risk factor for stomach cancer. This is one reason it is important to get rid of this bacteria.  When to call your healthcare provider  Call your healthcare provider right away if you have any of these:  · Vomiting blood, or vomit that looks like coffee grounds  · Bloody, black, or tarry-looking stools  · Fever of 100.4°F (38°C) or higher, or as directed  · Pain that gets worse  · Symptoms that dont get better with treatment, or symptoms that get worse  · New symptoms   Date Last Reviewed: 5/1/2016  © 3303-7726 [employer]. [address] [place] [address], [place] [address]. All rights reserved. This information is not intended as a substitute for professional medical care. Always follow your  healthcare professional's instructions.

## 2018-04-04 NOTE — PLAN OF CARE
Pt transferred back to floor via stretcher. Tele monitor in place. VSS. NADN. Family at bedside. Procedure note given to pt and one placed in chart. Bedside report given to LOVE Sung

## 2018-04-04 NOTE — H&P (VIEW-ONLY)
[facility]   Gastroenterology  Consult Note    Patient Name: Sarah Hernandez  MRN: [id number]  Admission Date: 4/3/2018  Hospital Length of Stay: 0 days  Code Status: Full Code   Attending Provider: Kaitlynn Camacho MD   Consulting Provider: David Davis PA-C  Primary Care Physician: Primary Doctor No  Principal Problem:Abnormal CT scan, gastrointestinal tract    Inpatient consult to Gastroenterology  Consult performed by: DAVID DAVIS  Consult ordered by: REID MISTRY  Reason for consult: Epigastric pain, n/v        Subjective:     HPI:  The patient presented to the ER with epigastric pain, nausea and vomiting. Symptoms started three days ago. She describes the pain as a constant ache with intermittent sharp shooting pain. Movement makes it worse and lying in the fetal position makes it better. She feels the nausea and vomiting is specifically related to the pain. She denies hematemesis. The antiemetics here seem to be helping the n/v. She usually has a soft BM 1-2 times a day. Her last BM was three days ago, described as diarrhea. She denies taking NSAIDs. No personal contacts with similar symptoms. Labs showed elevated WBC count which has improved overnight. LFTs ok. CT scan with contrast showed significant mucosal thickening within the stomach and pyloric region measuring up to 1.7 cm. She also had fluid-filled loops of small bowel seen within the left upper Quadrant with mucosal hyperemia and thickening which can be seen with focal enteritis. The patient had an EGD prior to her cholecystectomy over 15 years ago.     History reviewed. No pertinent past medical history.    Past Surgical History:   Procedure Laterality Date    APPENDECTOMY      CHOLECYSTECTOMY      MANDIBLE SURGERY      SINUS SURGERY      TONSILLECTOMY         Review of patient's allergies indicates:   Allergen Reactions    Antihistamines - alkylamine Hives    Augmentin [amoxicillin-pot clavulanate] Nausea And  Vomiting    Biaxin [clarithromycin] Nausea And Vomiting    Ceclor [cefaclor] Nausea And Vomiting    Ceftin [cefuroxime axetil] Nausea And Vomiting    Rocephin [ceftriaxone] Rash    Zithromax [azithromycin] Rash     Family History     Problem Relation (Age of Onset)    Colon cancer Paternal Grandfather        Social History Main Topics    Smoking status: Current Every Day Smoker    Smokeless tobacco: Not on file    Alcohol use Yes    Drug use: Unknown    Sexual activity: Not on file     Review of Systems   Constitutional: Negative for fever.   HENT: Negative for hearing loss.    Eyes: Negative for visual disturbance.   Respiratory: Negative for cough and shortness of breath.    Cardiovascular: Negative for chest pain.   Gastrointestinal:        As per HPI.   Genitourinary: Negative for dysuria, frequency and hematuria.   Musculoskeletal: Negative for arthralgias and back pain.   Skin: Negative for rash.   Neurological: Negative for seizures, syncope, numbness and headaches.   Hematological: Does not bruise/bleed easily.   Psychiatric/Behavioral: The patient is not nervous/anxious.      Objective:     Vital Signs (Most Recent):  Temp: 98.5 °F (36.9 °C) (04/04/18 0749)  Pulse: 87 (04/04/18 0749)  Resp: 16 (04/04/18 0749)  BP: (!) 152/82 (04/04/18 0749)  SpO2: 99 % (04/04/18 0749) Vital Signs (24h Range):  Temp:  [97.9 °F (36.6 °C)-99.3 °F (37.4 °C)] 98.5 °F (36.9 °C)  Pulse:  [75-98] 87  Resp:  [16-20] 16  SpO2:  [94 %-99 %] 99 %  BP: (122-152)/(63-96) 152/82     Weight: 104.9 kg (231 lb 6 oz) (04/03/18 1006)  Body mass index is 40.99 kg/m².      Intake/Output Summary (Last 24 hours) at 04/04/18 1120  Last data filed at 04/04/18 0600   Gross per 24 hour   Intake          2458.34 ml   Output                0 ml   Net          2458.34 ml       Lines/Drains/Airways     Peripheral Intravenous Line                 Peripheral IV - Single Lumen 04/03/18 2100 Left Upper Arm less than 1 day                Physical  Exam   Constitutional: She is oriented to person, place, and time. She appears well-developed and well-nourished.   HENT:   Head: Normocephalic and atraumatic.   Eyes: EOM are normal.   Neck: Normal range of motion. Neck supple. Carotid bruit is not present.   Cardiovascular: Normal rate and regular rhythm.    No murmur heard.  Pulmonary/Chest: Effort normal and breath sounds normal. No respiratory distress. She has no wheezes.   Abdominal: Soft. Normal appearance and bowel sounds are normal. She exhibits no distension and no mass. There is tenderness in the right upper quadrant and epigastric area.   Musculoskeletal: She exhibits no edema.   Neurological: She is alert and oriented to person, place, and time. No cranial nerve deficit.   Skin: Skin is warm and dry. No rash noted.   Psychiatric: Her behavior is normal.       Significant Labs:  CBC:   Recent Labs  Lab 04/03/18  1029 04/03/18  1409 04/04/18  0317   WBC 13.51* 15.59* 10.91   HGB 17.5* 15.9 14.4   HCT 49.3* 46.2 43.1    221 195     CMP:   Recent Labs  Lab 04/03/18  1029 04/04/18  0317    99   CALCIUM 9.8 8.5*   ALBUMIN 4.2  --    PROT 8.0  --     139   K 3.5 3.9   CO2 27 26    106   BUN 10 6   CREATININE 0.8 0.8   ALKPHOS 96  --    ALT 23  --    AST 18  --    BILITOT 0.9  --      Coagulation: No results for input(s): PT, INR, APTT in the last 48 hours.    Significant Imaging:  Imaging results within the past 24 hours have been reviewed.    Assessment/Plan:     * Abnormal CT scan, gastrointestinal tract    R/o ulcer. Plan as above.         Abdominal pain, nausea with vomiting    33 yo female with epigastric pain, nausea and vomiting.   CT scan showed gastric wall thickening.   Plan for EGD today.   Agree with IV Protonix.   Pain management per .   N/v under good control with current antiemetic regimen.             Thank you for your consult. I will follow-up with patient. Please contact us if you have any additional  questions.    Sachin Davis PA-C  Gastroenterology  [facility]

## 2018-04-04 NOTE — ANESTHESIA RELEASE NOTE
"Anesthesia Release from PACU Note    Patient: Sarah Hernandez    Procedure(s) Performed: Procedure(s) (LRB):  ESOPHAGOGASTRODUODENOSCOPY (EGD) (N/A)    Anesthesia type: MAC    Post pain: Adequate analgesia    Post assessment: no apparent anesthetic complications, tolerated procedure well and no evidence of recall    Last Vitals:   Visit Vitals  /67   Pulse 83   Temp 37.1 °C (98.7 °F)   Resp 18   Ht 5' 3" (1.6 m)   Wt 104.8 kg (231 lb)   LMP  (LMP Unknown)   SpO2 97%   Breastfeeding? No   BMI 40.92 kg/m²       Post vital signs: stable    Level of consciousness: awake, alert  and oriented    Nausea/Vomiting: no nausea/no vomiting    Complications: none    Airway Patency: patent    Respiratory: unassisted, spontaneous ventilation, room air    Cardiovascular: stable and blood pressure at baseline    Hydration: euvolemic  "

## 2018-04-04 NOTE — PLAN OF CARE
Problem: Patient Care Overview  Goal: Plan of Care Review  Plan of care discussed with patient and family at bedside. All questions answered. Patient has been NPO after midnight. Minimal complaints of pain throughout the night. Pain controlled with dilaudid 0.5 mg and oxycodone 10 mg. Some complaints of nausea. Patient has scheduled zofran and a one time order of Reglan 5 mg. Patient cannot take ordered phenergan due to allergy to antihistamines. Sinus rhythm on tele monitor. Will continue to monitor.

## 2018-04-04 NOTE — HPI
The patient presented to the ER with epigastric pain, nausea and vomiting. Symptoms started three days ago. She describes the pain as a constant ache with intermittent sharp shooting pain. Movement makes it worse and lying in the fetal position makes it better. She feels the nausea and vomiting is specifically related to the pain. She denies hematemesis. The antiemetics here seem to be helping the n/v. She usually has a soft BM 1-2 times a day. Her last BM was three days ago, described as diarrhea. She denies taking NSAIDs. No personal contacts with similar symptoms. Labs showed elevated WBC count which has improved overnight. LFTs ok. CT scan with contrast showed significant mucosal thickening within the stomach and pyloric region measuring up to 1.7 cm. She also had fluid-filled loops of small bowel seen within the left upper Quadrant with mucosal hyperemia and thickening which can be seen with focal enteritis. The patient had an EGD prior to her cholecystectomy over 15 years ago.

## 2018-04-04 NOTE — PLAN OF CARE
Problem: Patient Care Overview  Goal: Plan of Care Review  Outcome: Ongoing (interventions implemented as appropriate)  Pt. Is aware of plan of care to continue protonix IV as well as carafate scheduled.  Pt is sitting up in bed drinking her clear liquids for the first time since EGD.  No complaints of nausea or bout of emesis today.

## 2018-04-04 NOTE — ASSESSMENT & PLAN NOTE
31 yo female with epigastric pain, nausea and vomiting.   CT scan showed gastric wall thickening.   Plan for EGD today.   Agree with IV Protonix.   Pain management per .   N/v under good control with current antiemetic regimen.

## 2018-04-04 NOTE — ANESTHESIA PREPROCEDURE EVALUATION
04/04/2018  Sarah Hernandez is a 32 y.o., female.    Anesthesia Evaluation    I have reviewed the Patient Summary Reports.    I have reviewed the Nursing Notes.   I have reviewed the Medications.     Review of Systems  Anesthesia Hx:  No problems with previous Anesthesia    Social:  Smoker    Cardiovascular:  Cardiovascular Normal  ECG has been reviewed.    Pulmonary:  Pulmonary Normal    Hepatic/GI:  Hepatic/GI Symptoms: nausea, vomiting.    Endocrine:  Metabolic Disorders, Morbid Obesity / BMI > 40      Physical Exam  General:  Morbid Obesity    Airway/Jaw/Neck:  Airway Findings: Mouth Opening: Normal Tongue: Normal  General Airway Assessment: Adult  Mallampati: II  TM Distance: Normal, at least 6 cm      Dental:  Dental Findings: Lower Dentures, Upper Dentures   Chest/Lungs:  Chest/Lungs Findings: Normal Respiratory Rate     Heart/Vascular:  Heart Findings: Rate: Normal             Anesthesia Plan  Type of Anesthesia, risks & benefits discussed:  Anesthesia Type:  MAC  Patient's Preference:   Intra-op Monitoring Plan: standard ASA monitors  Intra-op Monitoring Plan Comments:   Post Op Pain Control Plan:   Post Op Pain Control Plan Comments:   Induction:   IV  Beta Blocker:  Patient is not currently on a Beta-Blocker (No further documentation required).       Informed Consent: Patient understands risks and agrees with Anesthesia plan.  Questions answered. Anesthesia consent signed with patient.  ASA Score: 2     Day of Surgery Review of History & Physical: I have interviewed and examined the patient. I have reviewed the patient's H&P dated:  There are no significant changes.          Ready For Surgery From Anesthesia Perspective.

## 2018-04-04 NOTE — CONSULTS
[facility] -   Gastroenterology  Consult Note    Patient Name: Sarah Hernandez  MRN: [id number]  Admission Date: 4/3/2018  Hospital Length of Stay: 0 days  Code Status: Full Code   Attending Provider: Kaitlynn Camacho MD   Consulting Provider: David Davis PA-C  Primary Care Physician: Primary Doctor No  Principal Problem:Abnormal CT scan, gastrointestinal tract    Inpatient consult to Gastroenterology  Consult performed by: DAVID DAVIS  Consult ordered by: REID MISTRY  Reason for consult: Epigastric pain, n/v        Subjective:     HPI:  The patient presented to the ER with epigastric pain, nausea and vomiting. Symptoms started three days ago. She describes the pain as a constant ache with intermittent sharp shooting pain. Movement makes it worse and lying in the fetal position makes it better. She feels the nausea and vomiting is specifically related to the pain. She denies hematemesis. The antiemetics here seem to be helping the n/v. She usually has a soft BM 1-2 times a day. Her last BM was three days ago, described as diarrhea. She denies taking NSAIDs. No personal contacts with similar symptoms. Labs showed elevated WBC count which has improved overnight. LFTs ok. CT scan with contrast showed significant mucosal thickening within the stomach and pyloric region measuring up to 1.7 cm. She also had fluid-filled loops of small bowel seen within the left upper Quadrant with mucosal hyperemia and thickening which can be seen with focal enteritis. The patient had an EGD prior to her cholecystectomy over 15 years ago.     History reviewed. No pertinent past medical history.    Past Surgical History:   Procedure Laterality Date    APPENDECTOMY      CHOLECYSTECTOMY      MANDIBLE SURGERY      SINUS SURGERY      TONSILLECTOMY         Review of patient's allergies indicates:   Allergen Reactions    Antihistamines - alkylamine Hives    Augmentin [amoxicillin-pot clavulanate] Nausea And  Vomiting    Biaxin [clarithromycin] Nausea And Vomiting    Ceclor [cefaclor] Nausea And Vomiting    Ceftin [cefuroxime axetil] Nausea And Vomiting    Rocephin [ceftriaxone] Rash    Zithromax [azithromycin] Rash     Family History     Problem Relation (Age of Onset)    Colon cancer Paternal Grandfather        Social History Main Topics    Smoking status: Current Every Day Smoker    Smokeless tobacco: Not on file    Alcohol use Yes    Drug use: Unknown    Sexual activity: Not on file     Review of Systems   Constitutional: Negative for fever.   HENT: Negative for hearing loss.    Eyes: Negative for visual disturbance.   Respiratory: Negative for cough and shortness of breath.    Cardiovascular: Negative for chest pain.   Gastrointestinal:        As per HPI.   Genitourinary: Negative for dysuria, frequency and hematuria.   Musculoskeletal: Negative for arthralgias and back pain.   Skin: Negative for rash.   Neurological: Negative for seizures, syncope, numbness and headaches.   Hematological: Does not bruise/bleed easily.   Psychiatric/Behavioral: The patient is not nervous/anxious.      Objective:     Vital Signs (Most Recent):  Temp: 98.5 °F (36.9 °C) (04/04/18 0749)  Pulse: 87 (04/04/18 0749)  Resp: 16 (04/04/18 0749)  BP: (!) 152/82 (04/04/18 0749)  SpO2: 99 % (04/04/18 0749) Vital Signs (24h Range):  Temp:  [97.9 °F (36.6 °C)-99.3 °F (37.4 °C)] 98.5 °F (36.9 °C)  Pulse:  [75-98] 87  Resp:  [16-20] 16  SpO2:  [94 %-99 %] 99 %  BP: (122-152)/(63-96) 152/82     Weight: 104.9 kg (231 lb 6 oz) (04/03/18 1006)  Body mass index is 40.99 kg/m².      Intake/Output Summary (Last 24 hours) at 04/04/18 1120  Last data filed at 04/04/18 0600   Gross per 24 hour   Intake          2458.34 ml   Output                0 ml   Net          2458.34 ml       Lines/Drains/Airways     Peripheral Intravenous Line                 Peripheral IV - Single Lumen 04/03/18 2100 Left Upper Arm less than 1 day                Physical  Exam   Constitutional: She is oriented to person, place, and time. She appears well-developed and well-nourished.   HENT:   Head: Normocephalic and atraumatic.   Eyes: EOM are normal.   Neck: Normal range of motion. Neck supple. Carotid bruit is not present.   Cardiovascular: Normal rate and regular rhythm.    No murmur heard.  Pulmonary/Chest: Effort normal and breath sounds normal. No respiratory distress. She has no wheezes.   Abdominal: Soft. Normal appearance and bowel sounds are normal. She exhibits no distension and no mass. There is tenderness in the right upper quadrant and epigastric area.   Musculoskeletal: She exhibits no edema.   Neurological: She is alert and oriented to person, place, and time. No cranial nerve deficit.   Skin: Skin is warm and dry. No rash noted.   Psychiatric: Her behavior is normal.       Significant Labs:  CBC:   Recent Labs  Lab 04/03/18  1029 04/03/18  1409 04/04/18  0317   WBC 13.51* 15.59* 10.91   HGB 17.5* 15.9 14.4   HCT 49.3* 46.2 43.1    221 195     CMP:   Recent Labs  Lab 04/03/18  1029 04/04/18  0317    99   CALCIUM 9.8 8.5*   ALBUMIN 4.2  --    PROT 8.0  --     139   K 3.5 3.9   CO2 27 26    106   BUN 10 6   CREATININE 0.8 0.8   ALKPHOS 96  --    ALT 23  --    AST 18  --    BILITOT 0.9  --      Coagulation: No results for input(s): PT, INR, APTT in the last 48 hours.    Significant Imaging:  Imaging results within the past 24 hours have been reviewed.    Assessment/Plan:     * Abnormal CT scan, gastrointestinal tract    R/o ulcer. Plan as above.         Abdominal pain, nausea with vomiting    31 yo female with epigastric pain, nausea and vomiting.   CT scan showed gastric wall thickening.   Plan for EGD today.   Agree with IV Protonix.   Pain management per .   N/v under good control with current antiemetic regimen.             Thank you for your consult. I will follow-up with patient. Please contact us if you have any additional  questions.    Sachin Davis PA-C  Gastroenterology  [facility] [place]

## 2018-04-04 NOTE — HOSPITAL COURSE
Pt is a 33 yo female who presented to ED with epigastric abdominal pain, nausea and vomiting x 3 days. Labs showed WBC 15 that has now normalized. CT abd showed Significant mucosal thickening is seen within the stomach within the pyloric region measuring up to 1.7 cm. Considerations include gastritis versus lymphoma or metastatic disease. Fluid-filled loops of small bowel are seen within the left upper quadrant with mucosal hyperemia and thickening which can be seen with focal enteritis.  Pt was placed on IV PPI. No further N/V. Care discussed with GI who recommended EGD which showed Non-obstructing non-bleeding gastric ulcers- biopsies obtained and pending.   GI recommended protonix BID, Zantac BID, and Carafate. F/U with GI in 4 weeks.  consulted for [facility] referral to GI. The pt was seen and examined today and determined to be stable for discharge.

## 2018-04-04 NOTE — SUBJECTIVE & OBJECTIVE
History reviewed. No pertinent past medical history.    Past Surgical History:   Procedure Laterality Date    APPENDECTOMY      CHOLECYSTECTOMY      MANDIBLE SURGERY      SINUS SURGERY      TONSILLECTOMY         Review of patient's allergies indicates:   Allergen Reactions    Antihistamines - alkylamine Hives    Augmentin [amoxicillin-pot clavulanate] Nausea And Vomiting    Biaxin [clarithromycin] Nausea And Vomiting    Ceclor [cefaclor] Nausea And Vomiting    Ceftin [cefuroxime axetil] Nausea And Vomiting    Rocephin [ceftriaxone] Rash    Zithromax [azithromycin] Rash     Family History     Problem Relation (Age of Onset)    Colon cancer Paternal Grandfather        Social History Main Topics    Smoking status: Current Every Day Smoker    Smokeless tobacco: Not on file    Alcohol use Yes    Drug use: Unknown    Sexual activity: Not on file     Review of Systems   Constitutional: Negative for fever.   HENT: Negative for hearing loss.    Eyes: Negative for visual disturbance.   Respiratory: Negative for cough and shortness of breath.    Cardiovascular: Negative for chest pain.   Gastrointestinal:        As per HPI.   Genitourinary: Negative for dysuria, frequency and hematuria.   Musculoskeletal: Negative for arthralgias and back pain.   Skin: Negative for rash.   Neurological: Negative for seizures, syncope, numbness and headaches.   Hematological: Does not bruise/bleed easily.   Psychiatric/Behavioral: The patient is not nervous/anxious.      Objective:     Vital Signs (Most Recent):  Temp: 98.5 °F (36.9 °C) (04/04/18 0749)  Pulse: 87 (04/04/18 0749)  Resp: 16 (04/04/18 0749)  BP: (!) 152/82 (04/04/18 0749)  SpO2: 99 % (04/04/18 0749) Vital Signs (24h Range):  Temp:  [97.9 °F (36.6 °C)-99.3 °F (37.4 °C)] 98.5 °F (36.9 °C)  Pulse:  [75-98] 87  Resp:  [16-20] 16  SpO2:  [94 %-99 %] 99 %  BP: (122-152)/(63-96) 152/82     Weight: 104.9 kg (231 lb 6 oz) (04/03/18 1006)  Body mass index is 40.99  kg/m².      Intake/Output Summary (Last 24 hours) at 04/04/18 1120  Last data filed at 04/04/18 0600   Gross per 24 hour   Intake          2458.34 ml   Output                0 ml   Net          2458.34 ml       Lines/Drains/Airways     Peripheral Intravenous Line                 Peripheral IV - Single Lumen 04/03/18 2100 Left Upper Arm less than 1 day                Physical Exam   Constitutional: She is oriented to person, place, and time. She appears well-developed and well-nourished.   HENT:   Head: Normocephalic and atraumatic.   Eyes: EOM are normal.   Neck: Normal range of motion. Neck supple. Carotid bruit is not present.   Cardiovascular: Normal rate and regular rhythm.    No murmur heard.  Pulmonary/Chest: Effort normal and breath sounds normal. No respiratory distress. She has no wheezes.   Abdominal: Soft. Normal appearance and bowel sounds are normal. She exhibits no distension and no mass. There is tenderness in the right upper quadrant and epigastric area.   Musculoskeletal: She exhibits no edema.   Neurological: She is alert and oriented to person, place, and time. No cranial nerve deficit.   Skin: Skin is warm and dry. No rash noted.   Psychiatric: Her behavior is normal.       Significant Labs:  CBC:   Recent Labs  Lab 04/03/18  1029 04/03/18  1409 04/04/18  0317   WBC 13.51* 15.59* 10.91   HGB 17.5* 15.9 14.4   HCT 49.3* 46.2 43.1    221 195     CMP:   Recent Labs  Lab 04/03/18  1029 04/04/18  0317    99   CALCIUM 9.8 8.5*   ALBUMIN 4.2  --    PROT 8.0  --     139   K 3.5 3.9   CO2 27 26    106   BUN 10 6   CREATININE 0.8 0.8   ALKPHOS 96  --    ALT 23  --    AST 18  --    BILITOT 0.9  --      Coagulation: No results for input(s): PT, INR, APTT in the last 48 hours.    Significant Imaging:  Imaging results within the past 24 hours have been reviewed.

## 2018-04-04 NOTE — ANESTHESIA POSTPROCEDURE EVALUATION
"Anesthesia Post Evaluation    Patient: Sarah Hernandez    Procedure(s) Performed: Procedure(s) (LRB):  ESOPHAGOGASTRODUODENOSCOPY (EGD) (N/A)    Final Anesthesia Type: MAC  Patient location during evaluation: GI PACU  Patient participation: Yes- Able to Participate  Level of consciousness: awake and alert and oriented  Post-procedure vital signs: reviewed and stable  Pain management: adequate  Airway patency: patent  PONV status at discharge: No PONV  Anesthetic complications: no      Cardiovascular status: blood pressure returned to baseline  Respiratory status: unassisted, room air and spontaneous ventilation  Hydration status: euvolemic  Follow-up not needed.        Visit Vitals  /67   Pulse 83   Temp 37.1 °C (98.7 °F)   Resp 18   Ht 5' 3" (1.6 m)   Wt 104.8 kg (231 lb)   LMP  (LMP Unknown)   SpO2 97%   Breastfeeding? No   BMI 40.92 kg/m²       Pain/Alexand[place] Score: Pain Assessment Performed: Yes (4/4/2018  4:54 PM)  Presence of Pain: complains of pain/discomfort (4/4/2018  4:54 PM)  Pain Rating Prior to Med Admin: 10 (4/4/2018  4:54 PM)  Pain Rating Post Med Admin: 2 (4/4/2018  5:24 PM)  Alexandria Score: 10 (4/4/2018  4:13 PM)      "

## 2018-04-04 NOTE — INTERVAL H&P NOTE
The patient has been examined and the H&P has been reviewed:    I concur with the findings and no changes have occurred since H&P was written.    Anesthesia/Surgery risks, benefits and alternative options discussed and understood by patient/family.          Active Hospital Problems    Diagnosis  POA    *Intractable abdominal pain and vomiting with nausea [R11.2]  Yes    Morbid obesity [E66.01]  Unknown    Abnormal CT scan, gastrointestinal tract [R93.3]  Yes    Leukocytosis [D72.829]  Yes      Resolved Hospital Problems    Diagnosis Date Resolved POA   No resolved problems to display.

## 2018-04-04 NOTE — SUBJECTIVE & OBJECTIVE
Review of Systems   Constitutional: Negative for appetite change, chills, diaphoresis, fatigue, fever and unexpected weight change.   HENT: Negative for congestion, nosebleeds, sinus pressure and sore throat.    Eyes: Negative for pain, discharge and visual disturbance.   Respiratory: Negative for cough, chest tightness, shortness of breath, wheezing and stridor.    Cardiovascular: Negative for chest pain, palpitations and leg swelling.   Gastrointestinal: Positive for abdominal pain, nausea and vomiting. Negative for abdominal distention, blood in stool, constipation and diarrhea.   Endocrine: Negative for cold intolerance and heat intolerance.   Genitourinary: Negative for difficulty urinating, dysuria, flank pain, frequency and urgency.   Musculoskeletal: Negative for arthralgias, back pain, joint swelling, myalgias, neck pain and neck stiffness.   Skin: Negative for rash and wound.   Allergic/Immunologic: Negative for food allergies and immunocompromised state.   Neurological: Negative for dizziness, seizures, syncope, facial asymmetry, speech difficulty, weakness, light-headedness, numbness and headaches.   Hematological: Negative for adenopathy.   Psychiatric/Behavioral: Negative for agitation, confusion and hallucinations.     Objective:     Vital Signs (Most Recent):  Temp: 98.5 °F (36.9 °C) (04/04/18 0749)  Pulse: 87 (04/04/18 0749)  Resp: 16 (04/04/18 0749)  BP: (!) 152/82 (04/04/18 0749)  SpO2: 99 % (04/04/18 0749) Vital Signs (24h Range):  Temp:  [97.9 °F (36.6 °C)-99.3 °F (37.4 °C)] 98.5 °F (36.9 °C)  Pulse:  [75-98] 87  Resp:  [16-20] 16  SpO2:  [94 %-99 %] 99 %  BP: (122-152)/(63-96) 152/82     Weight: 104.9 kg (231 lb 6 oz)  Body mass index is 40.99 kg/m².    Intake/Output Summary (Last 24 hours) at 04/04/18 1126  Last data filed at 04/04/18 0600   Gross per 24 hour   Intake          2458.34 ml   Output                0 ml   Net          2458.34 ml      Physical Exam   Constitutional: She is  oriented to person, place, and time. She appears well-developed and well-nourished. No distress.   HENT:   Head: Normocephalic and atraumatic.   Nose: Nose normal.   Mouth/Throat: Oropharynx is clear and moist.   Eyes: Conjunctivae and EOM are normal. No scleral icterus.   Neck: Normal range of motion. Neck supple. No tracheal deviation present.   Cardiovascular: Normal rate, regular rhythm, normal heart sounds and intact distal pulses.  Exam reveals no gallop and no friction rub.    No murmur heard.  Pulmonary/Chest: Effort normal and breath sounds normal. No stridor. No respiratory distress. She has no wheezes. She has no rales. She exhibits no tenderness.   Abdominal: Soft. Bowel sounds are normal. She exhibits no distension and no mass. There is tenderness (epigastric area). There is no rebound and no guarding.   Musculoskeletal: Normal range of motion. She exhibits no edema, tenderness or deformity.   Neurological: She is alert and oriented to person, place, and time. No cranial nerve deficit. She exhibits normal muscle tone. Coordination normal.   Skin: Skin is warm and dry. No rash noted. She is not diaphoretic. No erythema. No pallor.   Psychiatric: She has a normal mood and affect. Her behavior is normal. Thought content normal.       Significant Labs:   BMP:   Recent Labs  Lab 04/04/18  0317   GLU 99      K 3.9      CO2 26   BUN 6   CREATININE 0.8   CALCIUM 8.5*   MG 2.2     CBC:   Recent Labs  Lab 04/03/18  1029 04/03/18  1409 04/04/18  0317   WBC 13.51* 15.59* 10.91   HGB 17.5* 15.9 14.4   HCT 49.3* 46.2 43.1    221 195     CMP:   Recent Labs  Lab 04/03/18  1029 04/04/18  0317    139   K 3.5 3.9    106   CO2 27 26    99   BUN 10 6   CREATININE 0.8 0.8   CALCIUM 9.8 8.5*   PROT 8.0  --    ALBUMIN 4.2  --    BILITOT 0.9  --    ALKPHOS 96  --    AST 18  --    ALT 23  --    ANIONGAP 11 7*   EGFRNONAA >60 >60     All pertinent labs within the past 24 hours have been  reviewed.    Significant Imaging:  Imaging Results          CT Abdomen Pelvis With Contrast (Final result)     Abnormal  Result time 04/03/18 15:19:12    Final result by Frankie Carlos MD (04/03/18 15:19:12)                 Impression:        Significant mucosal thickening is seen within the stomach within the pyloric region measuring up to 1.7 cm. Considerations include gastritis versus lymphoma or metastatic disease. Recommend followup.    Fluid-filled loops of small bowel are seen within the left upper quadrant with mucosal hyperemia and thickening which can be seen with focal enteritis.    All CT scans at this facility use dose modulation, iterative reconstruction and/or weight based dosing when appropriate to reduce radiation dose to as low as reasonably achievable.      Electronically signed by: FRANKIE CARLOS MD  Date:     04/03/18  Time:    15:19              Narrative:    Clinical data: Abdominal pain.    Axial images through the abdomen and pelvis were obtained  after administration of 75 cc of omni-350 contrast. Coronal and sagittal reformatted images were also submitted for interpretation.    Findings:    The visualized portion of the heart is normal.  No pericardial effusion. The lung bases are clear with mild dependent changes.  No pleural effusion.    The liver,  biliary system, pancreas,  spleen, adrenal glands are normal. The gallbladder is surgically absent. The aorta demonstrates no significant atherosclerotic plaque.No lymphadenopathy.    The kidneys demonstrate normal size, position, contrast excretion with no focal abnormalities or hydronephrosis.The bladder is unremarkable. IUD is seen within the uterus. Probably prominent left ovarian vein with pelvic varices consistent with ovarian vein incompetence.    Significant mucosal thickening is seen within the stomach within the pyloric region measuring up to 1.7 cm. Considerations include gastritis versus lymphoma or metastatic disease.  Fluid-filled loops of small bowel are seen within the left upper quadrant with mucosal hyperemia and thickening which can be seen with focal enteritis. The colon is unremarkable. No evidence of acute appendicitis..    Bones appear intact .                             X-Ray Abdomen Flat And Erect (Final result)  Result time 04/03/18 12:42:19    Final result by MATILDA Villavicencio Sr., MD (04/03/18 12:42:19)                 Impression:      1. The bowel gas pattern is normal in appearance.  2. There is a mild amount of levoconvex curvature of the lumbar spine.   3. There are surgical clips projected over the right upper quadrant of the abdomen. This is characteristic of a prior cholecystectomy.  4. A T-type intrauterine device is projected over the pelvis.        Electronically signed by: MATILDA VILLAVICENCIO MD  Date:     04/03/18  Time:    12:42              Narrative:    Flat and erect KUB    History: Abdominal pain    Finding: The bowel gas pattern is normal in appearance. There is no pneumoperitoneum. There is a mild amount of levoconvex curvature of the lumbar spine. There are surgical clips projected over the right upper quadrant of the abdomen. A T-type intrauterine device is projected over the pelvis.

## 2018-04-04 NOTE — PROGRESS NOTES
Care discussed with Dr. Narayanan. Pt had multiple non bleeding gastric ulcers and inflammatory outlet obstruction- unable to tolerate po intake at present. Pt will need to stay in hospital for IV hydration, PPI, pain control, and Carafate.

## 2018-04-04 NOTE — PROGRESS NOTES
[facility] Hospital Medicine  Progress Note    Patient Name: Sarah Hernandez  MRN: [id number]  Patient Class: OP- Observation   Admission Date: 4/3/2018  Length of Stay: 0 days  Attending Physician: Kaitlynn Camacho MD  Primary Care Provider: Primary Doctor No        Subjective:     Principal Problem:Intractable vomiting with nausea    HPI:  Sarah Hernandez is a 32 year old female with complaints of a 3 day history of abdominal pain, nausea and vomiting. The patient describes the pain as severe. It is located in her epigastric region and improved with emesis. She also reports a small amount of watery diarrhea. She reports not being able to tolerate liquids or solids, but is drinking a Sprite at the time of exam. She denies fever, chills, heartburn, NSAID usage and history of gastritis. In the ED, the patient was found to have a WBC count of 15,590 and findings of significant mucosal thickening within the stomach at the pyloric region, measuring up to 1.7 cm with considerations including gastritis versus lymphoma or metastatic disease. Her metabolic panel was within normal limits.     Hospital Course:  Pt is a 31 yo female who presented to ED with epigastric abdominal pain, nausea and vomiting x 3 days. Labs showed WBC 15 that has now normalized. CT abd showed Significant mucosal thickening is seen within the stomach within the pyloric region measuring up to 1.7 cm. Considerations include gastritis versus lymphoma or metastatic disease. Fluid-filled loops of small bowel are seen within the left upper quadrant with mucosal hyperemia and thickening which can be seen with focal enteritis.  Pt was placed on IV PPI. No further N/V. Care discussed with GI who recommended EGD        Review of Systems   Constitutional: Negative for appetite change, chills, diaphoresis, fatigue, fever and unexpected weight change.   HENT: Negative for congestion, nosebleeds, sinus pressure and sore throat.    Eyes: Negative for  pain, discharge and visual disturbance.   Respiratory: Negative for cough, chest tightness, shortness of breath, wheezing and stridor.    Cardiovascular: Negative for chest pain, palpitations and leg swelling.   Gastrointestinal: Positive for abdominal pain, nausea and vomiting. Negative for abdominal distention, blood in stool, constipation and diarrhea.   Endocrine: Negative for cold intolerance and heat intolerance.   Genitourinary: Negative for difficulty urinating, dysuria, flank pain, frequency and urgency.   Musculoskeletal: Negative for arthralgias, back pain, joint swelling, myalgias, neck pain and neck stiffness.   Skin: Negative for rash and wound.   Allergic/Immunologic: Negative for food allergies and immunocompromised state.   Neurological: Negative for dizziness, seizures, syncope, facial asymmetry, speech difficulty, weakness, light-headedness, numbness and headaches.   Hematological: Negative for adenopathy.   Psychiatric/Behavioral: Negative for agitation, confusion and hallucinations.     Objective:     Vital Signs (Most Recent):  Temp: 98.5 °F (36.9 °C) (04/04/18 0749)  Pulse: 87 (04/04/18 0749)  Resp: 16 (04/04/18 0749)  BP: (!) 152/82 (04/04/18 0749)  SpO2: 99 % (04/04/18 0749) Vital Signs (24h Range):  Temp:  [97.9 °F (36.6 °C)-99.3 °F (37.4 °C)] 98.5 °F (36.9 °C)  Pulse:  [75-98] 87  Resp:  [16-20] 16  SpO2:  [94 %-99 %] 99 %  BP: (122-152)/(63-96) 152/82     Weight: 104.9 kg (231 lb 6 oz)  Body mass index is 40.99 kg/m².    Intake/Output Summary (Last 24 hours) at 04/04/18 1126  Last data filed at 04/04/18 0600   Gross per 24 hour   Intake          2458.34 ml   Output                0 ml   Net          2458.34 ml      Physical Exam   Constitutional: She is oriented to person, place, and time. She appears well-developed and well-nourished. No distress.   HENT:   Head: Normocephalic and atraumatic.   Nose: Nose normal.   Mouth/Throat: Oropharynx is clear and moist.   Eyes: Conjunctivae and EOM  are normal. No scleral icterus.   Neck: Normal range of motion. Neck supple. No tracheal deviation present.   Cardiovascular: Normal rate, regular rhythm, normal heart sounds and intact distal pulses.  Exam reveals no gallop and no friction rub.    No murmur heard.  Pulmonary/Chest: Effort normal and breath sounds normal. No stridor. No respiratory distress. She has no wheezes. She has no rales. She exhibits no tenderness.   Abdominal: Soft. Bowel sounds are normal. She exhibits no distension and no mass. There is tenderness (epigastric area). There is no rebound and no guarding.   Musculoskeletal: Normal range of motion. She exhibits no edema, tenderness or deformity.   Neurological: She is alert and oriented to person, place, and time. No cranial nerve deficit. She exhibits normal muscle tone. Coordination normal.   Skin: Skin is warm and dry. No rash noted. She is not diaphoretic. No erythema. No pallor.   Psychiatric: She has a normal mood and affect. Her behavior is normal. Thought content normal.       Significant Labs:   BMP:   Recent Labs  Lab 04/04/18  0317   GLU 99      K 3.9      CO2 26   BUN 6   CREATININE 0.8   CALCIUM 8.5*   MG 2.2     CBC:   Recent Labs  Lab 04/03/18  1029 04/03/18  1409 04/04/18  0317   WBC 13.51* 15.59* 10.91   HGB 17.5* 15.9 14.4   HCT 49.3* 46.2 43.1    221 195     CMP:   Recent Labs  Lab 04/03/18  1029 04/04/18  0317    139   K 3.5 3.9    106   CO2 27 26    99   BUN 10 6   CREATININE 0.8 0.8   CALCIUM 9.8 8.5*   PROT 8.0  --    ALBUMIN 4.2  --    BILITOT 0.9  --    ALKPHOS 96  --    AST 18  --    ALT 23  --    ANIONGAP 11 7*   EGFRNONAA >60 >60     All pertinent labs within the past 24 hours have been reviewed.    Significant Imaging:  Imaging Results          CT Abdomen Pelvis With Contrast (Final result)     Abnormal  Result time 04/03/18 15:19:12    Final result by Frankie Jeronimo MD (04/03/18 15:19:12)                 Impression:         Significant mucosal thickening is seen within the stomach within the pyloric region measuring up to 1.7 cm. Considerations include gastritis versus lymphoma or metastatic disease. Recommend followup.    Fluid-filled loops of small bowel are seen within the left upper quadrant with mucosal hyperemia and thickening which can be seen with focal enteritis.    All CT scans at this facility use dose modulation, iterative reconstruction and/or weight based dosing when appropriate to reduce radiation dose to as low as reasonably achievable.      Electronically signed by: AXEL CARLOS MD  Date:     04/03/18  Time:    15:19              Narrative:    Clinical data: Abdominal pain.    Axial images through the abdomen and pelvis were obtained  after administration of 75 cc of omni-350 contrast. Coronal and sagittal reformatted images were also submitted for interpretation.    Findings:    The visualized portion of the heart is normal.  No pericardial effusion. The lung bases are clear with mild dependent changes.  No pleural effusion.    The liver,  biliary system, pancreas,  spleen, adrenal glands are normal. The gallbladder is surgically absent. The aorta demonstrates no significant atherosclerotic plaque.No lymphadenopathy.    The kidneys demonstrate normal size, position, contrast excretion with no focal abnormalities or hydronephrosis.The bladder is unremarkable. IUD is seen within the uterus. Probably prominent left ovarian vein with pelvic varices consistent with ovarian vein incompetence.    Significant mucosal thickening is seen within the stomach within the pyloric region measuring up to 1.7 cm. Considerations include gastritis versus lymphoma or metastatic disease. Fluid-filled loops of small bowel are seen within the left upper quadrant with mucosal hyperemia and thickening which can be seen with focal enteritis. The colon is unremarkable. No evidence of acute appendicitis..    Bones appear intact .                              X-Ray Abdomen Flat And Erect (Final result)  Result time 04/03/18 12:42:19    Final result by MATILDA Villavicencio Sr., MD (04/03/18 12:42:19)                 Impression:      1. The bowel gas pattern is normal in appearance.  2. There is a mild amount of levoconvex curvature of the lumbar spine.   3. There are surgical clips projected over the right upper quadrant of the abdomen. This is characteristic of a prior cholecystectomy.  4. A T-type intrauterine device is projected over the pelvis.        Electronically signed by: MATILDA VILLAVICENCIO MD  Date:     04/03/18  Time:    12:42              Narrative:    Flat and erect KUB    History: Abdominal pain    Finding: The bowel gas pattern is normal in appearance. There is no pneumoperitoneum. There is a mild amount of levoconvex curvature of the lumbar spine. There are surgical clips projected over the right upper quadrant of the abdomen. A T-type intrauterine device is projected over the pelvis.                            Assessment/Plan:      * Intractable abdominal pain and vomiting with nausea    Cont IV protonix, IV antiemetics, and pain control.   EGD today         Morbid obesity               Leukocytosis    resolved        Abnormal CT scan, gastrointestinal tract    See above             VTE Risk Mitigation         Ordered     IP VTE LOW RISK PATIENT  Once      04/03/18 1730     Place sequential compression device  Until discontinued      04/03/18 1727              Irene Peralta NP  Department of Hospital Medicine   [facility]

## 2018-04-05 VITALS
WEIGHT: 231 LBS | BODY MASS INDEX: 40.93 KG/M2 | DIASTOLIC BLOOD PRESSURE: 89 MMHG | RESPIRATION RATE: 16 BRPM | TEMPERATURE: 98 F | HEIGHT: 63 IN | OXYGEN SATURATION: 99 % | SYSTOLIC BLOOD PRESSURE: 142 MMHG | HEART RATE: 77 BPM

## 2018-04-05 LAB
ANION GAP SERPL CALC-SCNC: 7 MMOL/L
BASOPHILS # BLD AUTO: 0.02 K/UL
BASOPHILS NFR BLD: 0.2 %
BUN SERPL-MCNC: 6 MG/DL
CALCIUM SERPL-MCNC: 8.6 MG/DL
CHLORIDE SERPL-SCNC: 106 MMOL/L
CO2 SERPL-SCNC: 28 MMOL/L
CREAT SERPL-MCNC: 0.7 MG/DL
DIFFERENTIAL METHOD: NORMAL
EOSINOPHIL # BLD AUTO: 0.2 K/UL
EOSINOPHIL NFR BLD: 1.6 %
ERYTHROCYTE [DISTWIDTH] IN BLOOD BY AUTOMATED COUNT: 14 %
EST. GFR  (AFRICAN AMERICAN): >60 ML/MIN/1.73 M^2
EST. GFR  (NON AFRICAN AMERICAN): >60 ML/MIN/1.73 M^2
GLUCOSE SERPL-MCNC: 90 MG/DL
HCT VFR BLD AUTO: 41.5 %
HGB BLD-MCNC: 14 G/DL
LYMPHOCYTES # BLD AUTO: 1.8 K/UL
LYMPHOCYTES NFR BLD: 18.7 %
MAGNESIUM SERPL-MCNC: 2.2 MG/DL
MCH RBC QN AUTO: 31 PG
MCHC RBC AUTO-ENTMCNC: 33.7 G/DL
MCV RBC AUTO: 92 FL
MONOCYTES # BLD AUTO: 1 K/UL
MONOCYTES NFR BLD: 9.8 %
NEUTROPHILS # BLD AUTO: 6.8 K/UL
NEUTROPHILS NFR BLD: 69.7 %
PHOSPHATE SERPL-MCNC: 3.1 MG/DL
PLATELET # BLD AUTO: 195 K/UL
PMV BLD AUTO: 11.1 FL
POTASSIUM SERPL-SCNC: 3.7 MMOL/L
RBC # BLD AUTO: 4.52 M/UL
SODIUM SERPL-SCNC: 141 MMOL/L
WBC # BLD AUTO: 9.77 K/UL

## 2018-04-05 PROCEDURE — 25000003 PHARM REV CODE 250: Performed by: NURSE PRACTITIONER

## 2018-04-05 PROCEDURE — 96374 THER/PROPH/DIAG INJ IV PUSH: CPT

## 2018-04-05 PROCEDURE — C9113 INJ PANTOPRAZOLE SODIUM, VIA: HCPCS | Performed by: PHYSICIAN ASSISTANT

## 2018-04-05 PROCEDURE — G0378 HOSPITAL OBSERVATION PER HR: HCPCS

## 2018-04-05 PROCEDURE — 63600175 PHARM REV CODE 636 W HCPCS: Performed by: NURSE PRACTITIONER

## 2018-04-05 PROCEDURE — 63600175 PHARM REV CODE 636 W HCPCS: Performed by: PHYSICIAN ASSISTANT

## 2018-04-05 PROCEDURE — 83735 ASSAY OF MAGNESIUM: CPT

## 2018-04-05 PROCEDURE — 96375 TX/PRO/DX INJ NEW DRUG ADDON: CPT

## 2018-04-05 PROCEDURE — 84100 ASSAY OF PHOSPHORUS: CPT

## 2018-04-05 PROCEDURE — 96376 TX/PRO/DX INJ SAME DRUG ADON: CPT

## 2018-04-05 PROCEDURE — 25000003 PHARM REV CODE 250: Performed by: PHYSICIAN ASSISTANT

## 2018-04-05 PROCEDURE — 85025 COMPLETE CBC W/AUTO DIFF WBC: CPT

## 2018-04-05 PROCEDURE — 36415 COLL VENOUS BLD VENIPUNCTURE: CPT

## 2018-04-05 PROCEDURE — 80048 BASIC METABOLIC PNL TOTAL CA: CPT

## 2018-04-05 RX ORDER — SUCRALFATE 1 G/1
1 TABLET ORAL
Qty: 120 TABLET | Refills: 0 | Status: ON HOLD | OUTPATIENT
Start: 2018-04-05 | End: 2018-05-10 | Stop reason: HOSPADM

## 2018-04-05 RX ORDER — PANTOPRAZOLE SODIUM 40 MG/1
40 TABLET, DELAYED RELEASE ORAL 2 TIMES DAILY
Qty: 60 TABLET | Refills: 1 | Status: SHIPPED | OUTPATIENT
Start: 2018-04-05 | End: 2019-04-05

## 2018-04-05 RX ORDER — SUCRALFATE 1 G/10ML
1 SUSPENSION ORAL
Qty: 1 BOTTLE | Refills: 0 | Status: SHIPPED | OUTPATIENT
Start: 2018-04-05 | End: 2018-04-05 | Stop reason: HOSPADM

## 2018-04-05 RX ORDER — HYDROCODONE BITARTRATE AND ACETAMINOPHEN 5; 325 MG/1; MG/1
1 TABLET ORAL EVERY 6 HOURS PRN
Qty: 15 TABLET | Refills: 0 | Status: SHIPPED | OUTPATIENT
Start: 2018-04-05 | End: 2023-04-18

## 2018-04-05 RX ADMIN — ONDANSETRON 4 MG: 2 INJECTION INTRAMUSCULAR; INTRAVENOUS at 05:04

## 2018-04-05 RX ADMIN — HYDROMORPHONE HYDROCHLORIDE 0.5 MG: 1 INJECTION, SOLUTION INTRAMUSCULAR; INTRAVENOUS; SUBCUTANEOUS at 05:04

## 2018-04-05 RX ADMIN — DEXTROSE MONOHYDRATE 40 MG: 5 INJECTION, SOLUTION INTRAVENOUS at 08:04

## 2018-04-05 RX ADMIN — SUCRALFATE 1 G: 1 SUSPENSION ORAL at 12:04

## 2018-04-05 RX ADMIN — SUCRALFATE 1 G: 1 SUSPENSION ORAL at 05:04

## 2018-04-05 RX ADMIN — OXYCODONE HYDROCHLORIDE 10 MG: 5 TABLET ORAL at 02:04

## 2018-04-05 RX ADMIN — OXYCODONE HYDROCHLORIDE 10 MG: 5 TABLET ORAL at 08:04

## 2018-04-05 NOTE — PROVATION PATIENT INSTRUCTIONS
Discharge Summary/Instructions after an Endoscopic Procedure  Patient Name: Sarah Hernandez  Patient MRN: [id number]  Patient YOB: 1985 Wednesday, April 04, 2018 Yariel Narayanan MD  RESTRICTIONS:  During your procedure today, you received medications for sedation.  These   medications may affect your judgment, balance and coordination.  Therefore,   for 24 hours, you have the following restrictions:   - DO NOT drive a car, operate machinery, make legal/financial decisions,   sign important papers or drink alcohol.    ACTIVITY:  The following day: return to full activity including work, except no heavy   lifting, straining or running for 3 days if polyps were removed.  DIET:  Eat and drink normally unless instructed otherwise.     TREATMENT FOR COMMON SIDE EFFECTS:  - Mild abdominal pain, nausea, belching, bloating or excessive gas:  rest,   eat lightly and use a heating pad.  - Sore Throat: treat with throat lozenges and/or gargle with warm salt   water.  - Because air was used during the procedure, expelling large amounts of air   from your rectum or belching is normal.  - If a bowel prep was taken, you may not have a bowel movement for 1-3 days.    This is normal.  SYMPTOMS TO WATCH FOR AND REPORT TO YOUR PHYSICIAN:  1. Abdominal pain or bloating, other than gas cramps.  2. Chest pain.  3. Back pain.  4. Signs of infection such as: chills or fever occurring within 24 hours   after the procedure.  5. Rectal bleeding, which would show as bright red, maroon, or black stools.   (A tablespoon of blood from the rectum is not serious, especially if   hemorrhoids are present.)  6. Vomiting.  7. Weakness or dizziness.  GO DIRECTLY TO THE NEAREST EMERGENCY ROOM IF YOU HAVE ANY OF THE FOLLOWING:      Difficulty breathing              Chills and/or fever over 101 F   Persistent vomiting and/or vomiting blood   Severe abdominal pain   Severe chest pain   Black, tarry stools   Bleeding- more than one  tablespoon   Any other symptom or condition that you feel may need urgent attention  Your doctor recommends these additional instructions:  If any biopsies were taken, your doctors clinic will contact you in 1 to 2   weeks with any results.  - Return patient to [facility] for ongoing care.   - NPO.   - Use Protonix (pantoprazole) 40 mg IV daily.   - Use sucralfate suspension 1 gram PO QID.   - Await pathology results.   - Repeat upper endoscopy in 4 weeks to check healing.  For questions, problems or results please call your physician Yariel Narayanan MD at Work:  [phone]  If you have any questions about the above instructions, call the GI   department at [phone] or call the endoscopy unit at [phone]   from 7am until 3 pm.  [facility], EMERGENCY ROOM PHONE NUMBER:   [phone]  IF A COMPLICATION OR EMERGENCY SITUATION ARISES AND YOU ARE UNABLE TO REACH   YOUR PHYSICIAN - GO DIRECTLY TO THE EMERGENCY ROOM.  I have read or have had read to me these discharge instructions for my   procedure and have received a written copy.  I understand these   instructions and will follow-up with my physician if I have any questions.     __________________________________       _____________________________________  Nurse Signature                                          Patient/Designated   Responsible Party Signature  Yariel Narayanan MD  4/4/2018 4:26:01 PM  This report has been verified and signed electronically.

## 2018-04-05 NOTE — NURSING
Discharge instructions discussed with patient and patient's mother.  Pt. Verbalized understanding.  IV DC'd.  Hemostasis achieved.  Pt. Has no complaints at this time.  Work excuse provided to patient as well as hard script for pain medication.  Pt. To  additional medication at the [facility].  Pt. Will be transported to exit via wheelchair by nurse and will be driven home by mother.

## 2018-04-05 NOTE — PLAN OF CARE
Problem: Patient Care Overview  Goal: Plan of Care Review  Plan of care reviewed with patient and mother at bedside. All questions answered. The importance of avoiding caffeine, stress, and smoking to reduce irritation to her ulcers was expressed to the patient upon asking could she have caffeine. Patient complained of moderate pain throughout the night. Pain controlled with PRN medications. Tele monitor discontinued. Will continue to monitor.

## 2018-04-05 NOTE — PLAN OF CARE
Consult received for [facility] referral.  CM met with patient at the bedside to discuss referral to [facility].  Patient demographics verified.  Patient currently lives at home with her father and is independent.  She does currently work.  CM informed patient that a referral will be made to [facility] and that they would contact her to set up an appointment.  CM also discussed that Medicaid application is now in pending status and that she should receive a determination in the mail.  CM discussed [facility] in [place] as an option for PCP.  CM suggested that a PCP be established.  CM instructed patient to try and establish with a GI doctor within her Medicaid network if her Medicaid is approved.  Patient will be expecting a call from [facility].  CM also checked prescribed medications on [employer].  Patient also has a prescription discount card that she uses.  The medications are affordable for patient. CM faxed referral to [facility] [phone]     04/05/18 1933   Discharge Assessment   Assessment Type Discharge Planning Assessment   Confirmed/corrected address and phone number on facesheet? Yes   Assessment information obtained from? Patient;Medical Record;Caregiver   Expected Length of Stay (days) 2   Communicated expected length of stay with patient/caregiver yes   Prior to hospitilization cognitive status: Alert/Oriented   Prior to hospitalization functional status: Independent   Current cognitive status: Alert/Oriented   Current Functional Status: Independent   Facility Arrived From: home   Lives With parent(s)   Able to Return to Prior Arrangements yes   Is patient able to care for self after discharge? Yes   Who are your caregiver(s) and their phone number(s)? Patient is independent.  Lorenzo Hernandez, father [phone]   Patient's perception of discharge disposition home or selfcare   Readmission Within The Last 30 Days no previous admission in last 30 days   Patient currently being followed by outpatient case management? No    Patient currently receives any other outside agency services? No   Equipment Currently Used at Home none   Do you have any problems affording any of your prescribed medications? TBD   Dialysis Name and Scheduled days NA   Does the patient receive services at the [facility]? No   Discharge Plan A Home with family   Patient/Family In Agreement With Plan yes

## 2018-04-05 NOTE — DISCHARGE SUMMARY
[facility] Hospital Medicine  Discharge Summary      Patient Name: Sarah Hernandez  MRN: [id number]  Admission Date: 4/3/2018  Hospital Length of Stay: 1 days  Discharge Date and Time:  04/05/2018 5:05 PM  Attending Physician: Dr. Camacho   Discharging Provider: Irene Peralta NP  Primary Care Provider: Primary Doctor No      HPI:   Sarah Hernandez is a 32 year old female with complaints of a 3 day history of abdominal pain, nausea and vomiting. The patient describes the pain as severe. It is located in her epigastric region and improved with emesis. She also reports a small amount of watery diarrhea. She reports not being able to tolerate liquids or solids, but is drinking a Sprite at the time of exam. She denies fever, chills, heartburn, NSAID usage and history of gastritis. In the ED, the patient was found to have a WBC count of 15,590 and findings of significant mucosal thickening within the stomach at the pyloric region, measuring up to 1.7 cm with considerations including gastritis versus lymphoma or metastatic disease. Her metabolic panel was within normal limits.     Procedure(s) (LRB):  ESOPHAGOGASTRODUODENOSCOPY (EGD) (N/A)      Hospital Course:   Pt is a 33 yo female who presented to ED with epigastric abdominal pain, nausea and vomiting x 3 days. Labs showed WBC 15 that has now normalized. CT abd showed Significant mucosal thickening is seen within the stomach within the pyloric region measuring up to 1.7 cm. Considerations include gastritis versus lymphoma or metastatic disease. Fluid-filled loops of small bowel are seen within the left upper quadrant with mucosal hyperemia and thickening which can be seen with focal enteritis.  Pt was placed on IV PPI. No further N/V. Care discussed with GI who recommended EGD which showed Non-obstructing non-bleeding gastric ulcers- biopsies obtained and pending.   GI recommended protonix BID, Zantac BID, and Carafate. F/U with GI in 4 weeks. Social  worker consulted for [facility] referral to GI. The pt was seen and examined today and determined to be stable for discharge.                             Consults:   Consults         Status Ordering Provider     Inpatient consult to Gastroenterology  Once     Provider:  (Not yet assigned)    Completed [facility]     Inpatient consult to Social Work  Once     Provider:  (Not yet assigned)    Completed CHANDA EDGAR new Assessment & Plan notes have been filed under this hospital service since the last note was generated.  Service: Hospital Medicine    Final Active Diagnoses:    Diagnosis Date Noted POA    PRINCIPAL PROBLEM:  Non-intractable vomiting with nausea [R11.2] 04/03/2018 Yes    Multiple gastric ulcers [K25.9] 04/04/2018 Yes    Morbid obesity [E66.01] 04/04/2018 Unknown    Abnormal CT scan, gastrointestinal tract [R93.3] 04/03/2018 Yes    Leukocytosis [D72.829] 04/03/2018 Yes      Problems Resolved During this Admission:    Diagnosis Date Noted Date Resolved POA       Discharged Condition: stable    Disposition: Home or Self Care    Follow Up:  Follow-up Information     Primary Doctor No In 3 days.           [facility].    Why:  [facility] will call you to set up an appointment           [facility] .    Why:  Please call to set up a primary care appointment  Contact information:  [address] [place]  [address] [phone]               Patient Instructions:     Diet Adult Regular     Activity as tolerated         Significant Diagnostic Studies:   Imaging Results          CT Abdomen Pelvis With Contrast (Final result)     Abnormal  Result time 04/03/18 15:19:12    Final result by Frankie Jeronimo MD (04/03/18 15:19:12)                 Impression:        Significant mucosal thickening is seen within the stomach within the pyloric region measuring up to 1.7 cm. Considerations include gastritis versus lymphoma or metastatic disease. Recommend followup.    Fluid-filled loops of small bowel are seen  within the left upper quadrant with mucosal hyperemia and thickening which can be seen with focal enteritis.    All CT scans at this facility use dose modulation, iterative reconstruction and/or weight based dosing when appropriate to reduce radiation dose to as low as reasonably achievable.      Electronically signed by: AXEL CARLOS MD  Date:     04/03/18  Time:    15:19              Narrative:    Clinical data: Abdominal pain.    Axial images through the abdomen and pelvis were obtained  after administration of 75 cc of omni-350 contrast. Coronal and sagittal reformatted images were also submitted for interpretation.    Findings:    The visualized portion of the heart is normal.  No pericardial effusion. The lung bases are clear with mild dependent changes.  No pleural effusion.    The liver,  biliary system, pancreas,  spleen, adrenal glands are normal. The gallbladder is surgically absent. The aorta demonstrates no significant atherosclerotic plaque.No lymphadenopathy.    The kidneys demonstrate normal size, position, contrast excretion with no focal abnormalities or hydronephrosis.The bladder is unremarkable. IUD is seen within the uterus. Probably prominent left ovarian vein with pelvic varices consistent with ovarian vein incompetence.    Significant mucosal thickening is seen within the stomach within the pyloric region measuring up to 1.7 cm. Considerations include gastritis versus lymphoma or metastatic disease. Fluid-filled loops of small bowel are seen within the left upper quadrant with mucosal hyperemia and thickening which can be seen with focal enteritis. The colon is unremarkable. No evidence of acute appendicitis..    Bones appear intact .                             X-Ray Abdomen Flat And Erect (Final result)  Result time 04/03/18 12:42:19    Final result by MATILDA Villavicencio Sr., MD (04/03/18 12:42:19)                 Impression:      1. The bowel gas pattern is normal in appearance.  2. There is  a mild amount of levoconvex curvature of the lumbar spine.   3. There are surgical clips projected over the right upper quadrant of the abdomen. This is characteristic of a prior cholecystectomy.  4. A T-type intrauterine device is projected over the pelvis.        Electronically signed by: MATILDA CONRAD MD  Date:     04/03/18  Time:    12:42              Narrative:    Flat and erect KUB    History: Abdominal pain    Finding: The bowel gas pattern is normal in appearance. There is no pneumoperitoneum. There is a mild amount of levoconvex curvature of the lumbar spine. There are surgical clips projected over the right upper quadrant of the abdomen. A T-type intrauterine device is projected over the pelvis.                              Pending Diagnostic Studies:     None         Medications:  Reconciled Home Medications:      Medication List      START taking these medications    hydrocodone-acetaminophen 5-325mg 5-325 mg per tablet  Commonly known as:  NORCO  Take 1 tablet by mouth every 6 (six) hours as needed for Pain.     pantoprazole 40 MG tablet  Commonly known as:  PROTONIX  Take 1 tablet (40 mg total) by mouth 2 (two) times daily.     sucralfate 1 gram tablet  Commonly known as:  CARAFATE  Take 1 tablet (1 g total) by mouth 4 (four) times daily before meals and nightly.           Where to Get Your Medications      [facility]  [address]  [address] [place]  [address] [address] [place] [address]    Phone:  [phone]   · pantoprazole 40 MG tablet  · sucralfate 1 gram tablet     You can get these medications from any pharmacy    Bring a paper prescription for each of these medications  · hydrocodone-acetaminophen 5-325mg 5-325 mg per tablet         Indwelling Lines/Drains at time of discharge:   Lines/Drains/Airways          No matching active lines, drains, or airways          Time spent on the discharge of patient: 43  minutes  Patient was seen and examined on the date of discharge and determined to be suitable for discharge.         Irene Peralta NP  Department of Hospital Medicine  [facility]

## 2018-04-11 DIAGNOSIS — K25.9 MULTIPLE GASTRIC ULCERS: Primary | ICD-10-CM

## 2018-05-09 ENCOUNTER — ANESTHESIA EVENT (OUTPATIENT)
Dept: ENDOSCOPY | Facility: HOSPITAL | Age: 33
End: 2018-05-09
Payer: MEDICAID

## 2018-05-10 ENCOUNTER — ANESTHESIA (OUTPATIENT)
Dept: ENDOSCOPY | Facility: HOSPITAL | Age: 33
End: 2018-05-10
Payer: MEDICAID

## 2018-05-10 ENCOUNTER — HOSPITAL ENCOUNTER (OUTPATIENT)
Facility: HOSPITAL | Age: 33
Discharge: HOME | End: 2018-05-10
Attending: INTERNAL MEDICINE | Admitting: INTERNAL MEDICINE
Payer: MEDICAID

## 2018-05-10 ENCOUNTER — SURGERY (OUTPATIENT)
Age: 33
End: 2018-05-10

## 2018-05-10 VITALS
RESPIRATION RATE: 12 BRPM | HEART RATE: 83 BPM | WEIGHT: 229 LBS | TEMPERATURE: 99 F | HEIGHT: 63 IN | DIASTOLIC BLOOD PRESSURE: 94 MMHG | SYSTOLIC BLOOD PRESSURE: 110 MMHG | BODY MASS INDEX: 40.57 KG/M2 | OXYGEN SATURATION: 97 %

## 2018-05-10 DIAGNOSIS — K25.9 GASTRIC ULCER: ICD-10-CM

## 2018-05-10 PROCEDURE — 43235 EGD DIAGNOSTIC BRUSH WASH: CPT | Mod: ,,, | Performed by: INTERNAL MEDICINE

## 2018-05-10 PROCEDURE — 37000009 HC ANESTHESIA EA ADD 15 MINS: Performed by: INTERNAL MEDICINE

## 2018-05-10 PROCEDURE — 25000003 PHARM REV CODE 250: Performed by: INTERNAL MEDICINE

## 2018-05-10 PROCEDURE — 43239 EGD BIOPSY SINGLE/MULTIPLE: CPT | Performed by: INTERNAL MEDICINE

## 2018-05-10 PROCEDURE — 00731 ANES UPR GI NDSC PX NOS: CPT | Performed by: INTERNAL MEDICINE

## 2018-05-10 PROCEDURE — 63600175 PHARM REV CODE 636 W HCPCS: Performed by: NURSE ANESTHETIST, CERTIFIED REGISTERED

## 2018-05-10 PROCEDURE — 27201012 HC FORCEPS, HOT/COLD, DISP: Performed by: INTERNAL MEDICINE

## 2018-05-10 PROCEDURE — 37000008 HC ANESTHESIA 1ST 15 MINUTES: Performed by: INTERNAL MEDICINE

## 2018-05-10 RX ORDER — PROPOFOL 10 MG/ML
INJECTION, EMULSION INTRAVENOUS
Status: DISCONTINUED | OUTPATIENT
Start: 2018-05-10 | End: 2018-05-10

## 2018-05-10 RX ORDER — SODIUM CHLORIDE, SODIUM LACTATE, POTASSIUM CHLORIDE, CALCIUM CHLORIDE 600; 310; 30; 20 MG/100ML; MG/100ML; MG/100ML; MG/100ML
INJECTION, SOLUTION INTRAVENOUS CONTINUOUS
Status: DISCONTINUED | OUTPATIENT
Start: 2018-05-10 | End: 2018-05-10 | Stop reason: HOSPADM

## 2018-05-10 RX ORDER — LIDOCAINE HCL/PF 100 MG/5ML
SYRINGE (ML) INTRAVENOUS
Status: DISCONTINUED | OUTPATIENT
Start: 2018-05-10 | End: 2018-05-10

## 2018-05-10 RX ADMIN — LIDOCAINE HYDROCHLORIDE 100 MG: 20 INJECTION, SOLUTION INTRAVENOUS at 11:05

## 2018-05-10 RX ADMIN — SODIUM CHLORIDE, SODIUM LACTATE, POTASSIUM CHLORIDE, AND CALCIUM CHLORIDE: 600; 310; 30; 20 INJECTION, SOLUTION INTRAVENOUS at 10:05

## 2018-05-10 RX ADMIN — SODIUM CHLORIDE, SODIUM LACTATE, POTASSIUM CHLORIDE, AND CALCIUM CHLORIDE: 600; 310; 30; 20 INJECTION, SOLUTION INTRAVENOUS at 11:05

## 2018-05-10 RX ADMIN — PROPOFOL 50 MG: 10 INJECTION, EMULSION INTRAVENOUS at 11:05

## 2018-05-10 RX ADMIN — PROPOFOL 200 MG: 10 INJECTION, EMULSION INTRAVENOUS at 11:05

## 2018-05-10 NOTE — DISCHARGE INSTRUCTIONS

## 2018-05-10 NOTE — ANESTHESIA POSTPROCEDURE EVALUATION
"Anesthesia Post Evaluation    Patient: Sarah Hernandez    Procedure(s) Performed: Procedure(s) (LRB):  ESOPHAGOGASTRODUODENOSCOPY (EGD) (N/A)    Final Anesthesia Type: MAC  Patient location during evaluation: PACU  Patient participation: Yes- Able to Participate  Level of consciousness: awake and alert and oriented  Post-procedure vital signs: reviewed and stable  Pain management: adequate  Airway patency: patent  PONV status at discharge: No PONV  Anesthetic complications: no      Cardiovascular status: blood pressure returned to baseline  Respiratory status: unassisted, spontaneous ventilation and room air  Hydration status: euvolemic  Follow-up not needed.        Visit Vitals  /63 (BP Location: Left arm, Patient Position: Lying)   Pulse 98   Temp 37.3 °C (99.1 °F) (Oral)   Resp 16   Ht 5' 3" (1.6 m)   Wt 103.9 kg (229 lb)   SpO2 98%   Breastfeeding? No   BMI 40.57 kg/m²       Pain/[place] Score: Pain Assessment Performed: Yes (5/10/2018 10:08 AM)  Presence of Pain: denies (5/10/2018 10:08 AM)      "

## 2018-05-10 NOTE — ANESTHESIA PREPROCEDURE EVALUATION
05/10/2018  Sarah Hernandez is a 32 y.o., female.    Anesthesia Evaluation    I have reviewed the Patient Summary Reports.    I have reviewed the Nursing Notes.   I have reviewed the Medications.     Review of Systems  Anesthesia Hx:  No problems with previous Anesthesia    Social:  Smoker, Social Alcohol Use 1/2-1 ppd for about 15 years.   Hematology/Oncology:  Hematology Normal   Oncology Normal     EENT/Dental:EENT/Dental Normal   Cardiovascular:   Exercise tolerance: poor ECG has been reviewed. Normal sinus rhythm  Possible Left atrial enlargement  LVH  Nonspecific ST and T wave abnormality  Abnormal ECG  No previous ECGs available  Confirmed by PIETER CRAIG, JING ([id number]) on 4/4/2018 7:17:15 AM   Pulmonary:  Pulmonary Normal Snore   Hepatic/GI:   PUD, 2230 last drink of fluid.   Musculoskeletal:  Musculoskeletal Normal    Neurological:  Neurology Normal    Endocrine:  Endocrine Normal    Dermatological:  Skin Normal    Psych:  Psychiatric Normal           Physical Exam  General:  Well nourished, Morbid Obesity    Airway/Jaw/Neck:  Airway Findings: Mallampati: III                Anesthesia Plan  Type of Anesthesia, risks & benefits discussed:  Anesthesia Type:  MAC  Patient's Preference:   Intra-op Monitoring Plan:   Intra-op Monitoring Plan Comments:   Post Op Pain Control Plan:   Post Op Pain Control Plan Comments:   Induction:   IV  Beta Blocker:  Patient is not currently on a Beta-Blocker (No further documentation required).       Informed Consent: Patient understands risks and agrees with Anesthesia plan.  Questions answered. Anesthesia consent signed with patient.  ASA Score: 2     Day of Surgery Review of History & Physical: I have interviewed and examined the patient. I have reviewed the patient's H&P dated: 05/10/18. There are no significant changes.  H&P update referred to the provider.          Ready For Surgery From Anesthesia Perspective.

## 2018-05-10 NOTE — H&P
Short Stay Endoscopy History and Physical    PCP - Yamilet Eid MD    Procedure - EGD  ASA - II  Mallampati - per anesthesia  History of Anesthesia problems - no  Family history Anesthesia problems -  no     HPI:    Reason for EGD:  Heartburn: No  Dysphagia: No  Follow up of ulcer:yes  Anemia - no  GI bleeding - no  Abdominal pain: No  Diarrhea - no    ROS:  CONSTITUTIONAL: Denies weight change,  fatigue, fevers, chills, night sweats.  CARDIOVASCULAR: Denies chest pain, shortness of breath, orthopnea and edema.  RESPIRATORY: Denies cough, hemoptysis, dyspnea, and wheezing.  GI: See HPI.    Medical History:   History reviewed. No pertinent past medical history.    Surgical History:   Past Surgical History:   Procedure Laterality Date    APPENDECTOMY      BACK SURGERY      bone spur removal    CHOLECYSTECTOMY      MANDIBLE SURGERY      SINUS SURGERY      TONSILLECTOMY      adenoids       Family History:  Family History   Problem Relation Age of Onset    Colon cancer Paternal Grandfather     Hypertension Father     Hyperlipidemia Father        Social History:   Social History   Substance Use Topics    Smoking status: Current Every Day Smoker    Smokeless tobacco: Never Used    Alcohol use Yes       Allergies:   Review of patient's allergies indicates:   Allergen Reactions    Antihistamines - alkylamine Anaphylaxis    Augmentin [amoxicillin-pot clavulanate] Nausea And Vomiting    Biaxin [clarithromycin] Nausea And Vomiting    Ceclor [cefaclor] Nausea And Vomiting    Ceftin [cefuroxime axetil] Nausea And Vomiting    Rocephin [ceftriaxone] Rash    Zithromax [azithromycin] Rash       Medications:   No current facility-administered medications on file prior to encounter.      Current Outpatient Prescriptions on File Prior to Encounter   Medication Sig Dispense Refill    hydrocodone-acetaminophen 5-325mg (NORCO) 5-325 mg per tablet Take 1 tablet by mouth every 6 (six) hours as needed for Pain. 15  tablet 0    pantoprazole (PROTONIX) 40 MG tablet Take 1 tablet (40 mg total) by mouth 2 (two) times daily. 60 tablet 1    sucralfate (CARAFATE) 1 gram tablet Take 1 tablet (1 g total) by mouth 4 (four) times daily before meals and nightly. 120 tablet 0       Physical Exam:  Vital Signs:   Vitals:    05/10/18 1013   BP: (!) 143/90   Pulse: 92   Resp: (!) 24   Temp: 99.1 °F (37.3 °C)     General Appearance: Well appearing in no acute distress  ENT: OP clear  Chest: CTA B  CV: RRR, no m/r/g  Abd: s/nt/nd/nabs  Ext: no edema    Labs:  Lab Results   Component Value Date    WBC 9.77 04/05/2018    HGB 14.0 04/05/2018    HCT 41.5 04/05/2018    MCV 92 04/05/2018     04/05/2018     No results found for: INR, PROTIME  No results found for: IRON, TIBC, FERRITIN, SATURATEDIRO      Assessment:  Patient Active Problem List   Diagnosis    Abnormal CT scan, gastrointestinal tract    Non-intractable vomiting with nausea    Leukocytosis    Morbid obesity    Multiple gastric ulcers    Gastric ulcer         Plan:  I have explained the risks and benefits of endoscopy procedures to the patient including but not limited to bleeding, perforation, infection, and death. The patient wishes to proceed.

## 2018-05-10 NOTE — TRANSFER OF CARE
"Anesthesia Transfer of Care Note    Patient: Sarah Hernandez    Procedure(s) Performed: Procedure(s) (LRB):  ESOPHAGOGASTRODUODENOSCOPY (EGD) (N/A)    Patient location: PACU    Anesthesia Type: MAC    Transport from OR: Transported from OR on room air with adequate spontaneous ventilation    Post pain: adequate analgesia    Post assessment: no apparent anesthetic complications    Post vital signs: stable    Level of consciousness: awake    Nausea/Vomiting: no nausea/vomiting    Complications: none    Transfer of care protocol was followed      Last vitals:   Visit Vitals  /63 (BP Location: Left arm, Patient Position: Lying)   Pulse 98   Temp 37.3 °C (99.1 °F) (Oral)   Resp 16   Ht 5' 3" (1.6 m)   Wt 103.9 kg (229 lb)   SpO2 98%   Breastfeeding? No   BMI 40.57 kg/m²     "

## 2018-05-10 NOTE — PROVATION PATIENT INSTRUCTIONS
Discharge Summary/Instructions after an Endoscopic Procedure  Patient Name: Sarah Hernandez  Patient MRN: [id number]  Patient YOB: 1985  Thursday, May 10, 2018 Yariel Narayanan MD  RESTRICTIONS:  During your procedure today, you received medications for sedation.  These   medications may affect your judgment, balance and coordination.  Therefore,   for 24 hours, you have the following restrictions:   - DO NOT drive a car, operate machinery, make legal/financial decisions,   sign important papers or drink alcohol.    ACTIVITY:  The following day: return to full activity including work, except no heavy   lifting, straining or running for 3 days if polyps were removed.  DIET:  Eat and drink normally unless instructed otherwise.     TREATMENT FOR COMMON SIDE EFFECTS:  - Mild abdominal pain, nausea, belching, bloating or excessive gas:  rest,   eat lightly and use a heating pad.  - Sore Throat: treat with throat lozenges and/or gargle with warm salt   water.  - Because air was used during the procedure, expelling large amounts of air   from your rectum or belching is normal.  - If a bowel prep was taken, you may not have a bowel movement for 1-3 days.    This is normal.  SYMPTOMS TO WATCH FOR AND REPORT TO YOUR PHYSICIAN:  1. Abdominal pain or bloating, other than gas cramps.  2. Chest pain.  3. Back pain.  4. Signs of infection such as: chills or fever occurring within 24 hours   after the procedure.  5. Rectal bleeding, which would show as bright red, maroon, or black stools.   (A tablespoon of blood from the rectum is not serious, especially if   hemorrhoids are present.)  6. Vomiting.  7. Weakness or dizziness.  GO DIRECTLY TO THE NEAREST EMERGENCY ROOM IF YOU HAVE ANY OF THE FOLLOWING:      Difficulty breathing              Chills and/or fever over 101 F   Persistent vomiting and/or vomiting blood   Severe abdominal pain   Severe chest pain   Black, tarry stools   Bleeding- more than one  tablespoon   Any other symptom or condition that you feel may need urgent attention  Your doctor recommends these additional instructions:  If any biopsies were taken, your doctors clinic will contact you in 1 to 2   weeks with any results.  - Patient has a contact number available for emergencies.  The signs and   symptoms of potential delayed complications were discussed with the   patient.  Return to normal activities tomorrow.  Written discharge   instructions were provided to the patient.   - Resume previous diet.   - Continue present medications.   - Can stop Protonix and Carafate.  - Return to primary care physician as previously scheduled.   - Discharge patient to home (with escort).  For questions, problems or results please call your physician Yariel Narayanan MD at Work:  [phone]  If you have any questions about the above instructions, call the GI   department at [phone] or call the endoscopy unit at [phone]   from 7am until 3 pm.  [facility], EMERGENCY ROOM PHONE NUMBER:   [phone]  IF A COMPLICATION OR EMERGENCY SITUATION ARISES AND YOU ARE UNABLE TO REACH   YOUR PHYSICIAN - GO DIRECTLY TO THE EMERGENCY ROOM.  I have read or have had read to me these discharge instructions for my   procedure and have received a written copy.  I understand these   instructions and will follow-up with my physician if I have any questions.     __________________________________       _____________________________________  Nurse Signature                                          Patient/Designated   Responsible Party Signature  Yariel Narayanan MD  5/10/2018 11:34:21 AM  This report has been verified and signed electronically.

## 2018-05-10 NOTE — PLAN OF CARE
Oracio CRAIG at bedside. Discussed procedure results with patient and family. Vital signs stable.

## 2018-05-10 NOTE — ANESTHESIA RELEASE NOTE
"Anesthesia Release from PACU Note    Patient: Sarah Hernandez    Procedure(s) Performed: Procedure(s) (LRB):  ESOPHAGOGASTRODUODENOSCOPY (EGD) (N/A)    Anesthesia type: MAC    Post pain: Adequate analgesia    Post assessment: no apparent anesthetic complications, tolerated procedure well and no evidence of recall    Last Vitals:   Visit Vitals  /63 (BP Location: Left arm, Patient Position: Lying)   Pulse 98   Temp 37.3 °C (99.1 °F) (Oral)   Resp 16   Ht 5' 3" (1.6 m)   Wt 103.9 kg (229 lb)   SpO2 98%   Breastfeeding? No   BMI 40.57 kg/m²       Post vital signs: stable    Level of consciousness: awake, alert  and oriented    Nausea/Vomiting: no nausea/no vomiting    Complications: none    Airway Patency: patent    Respiratory: unassisted, spontaneous ventilation, room air    Cardiovascular: stable    Hydration: euvolemic  "

## 2021-05-18 ENCOUNTER — HOSPITAL ENCOUNTER (EMERGENCY)
Facility: HOSPITAL | Age: 36
Discharge: HOME | End: 2021-05-18
Attending: EMERGENCY MEDICINE
Payer: MEDICAID

## 2021-05-18 VITALS
SYSTOLIC BLOOD PRESSURE: 138 MMHG | OXYGEN SATURATION: 100 % | WEIGHT: 209.88 LBS | HEART RATE: 91 BPM | RESPIRATION RATE: 20 BRPM | HEIGHT: 63 IN | BODY MASS INDEX: 37.19 KG/M2 | DIASTOLIC BLOOD PRESSURE: 82 MMHG

## 2021-05-18 DIAGNOSIS — R00.2 PALPITATIONS: ICD-10-CM

## 2021-05-18 DIAGNOSIS — R82.71 BACTERIA IN URINE: Primary | ICD-10-CM

## 2021-05-18 LAB
ALBUMIN SERPL BCP-MCNC: 4.6 G/DL (ref 3.5–5.2)
ALP SERPL-CCNC: 75 U/L (ref 55–135)
ALT SERPL W/O P-5'-P-CCNC: 33 U/L (ref 10–44)
ANION GAP SERPL CALC-SCNC: 14 MMOL/L (ref 8–16)
AST SERPL-CCNC: 22 U/L (ref 10–40)
B-HCG UR QL: NEGATIVE
BACTERIA #/AREA URNS HPF: ABNORMAL /HPF
BASOPHILS # BLD AUTO: 0.07 K/UL (ref 0–0.2)
BASOPHILS NFR BLD: 0.4 % (ref 0–1.9)
BILIRUB SERPL-MCNC: 1 MG/DL (ref 0.1–1)
BILIRUB UR QL STRIP: NEGATIVE
BNP SERPL-MCNC: <10 PG/ML (ref 0–99)
BUN SERPL-MCNC: 14 MG/DL (ref 6–20)
CALCIUM SERPL-MCNC: 9.6 MG/DL (ref 8.7–10.5)
CHLORIDE SERPL-SCNC: 100 MMOL/L (ref 95–110)
CK SERPL-CCNC: 57 U/L (ref 20–180)
CLARITY UR: CLEAR
CO2 SERPL-SCNC: 24 MMOL/L (ref 23–29)
COLOR UR: YELLOW
CREAT SERPL-MCNC: 0.8 MG/DL (ref 0.5–1.4)
CTP QC/QA: YES
DIFFERENTIAL METHOD: ABNORMAL
EOSINOPHIL # BLD AUTO: 0.2 K/UL (ref 0–0.5)
EOSINOPHIL NFR BLD: 1.1 % (ref 0–8)
ERYTHROCYTE [DISTWIDTH] IN BLOOD BY AUTOMATED COUNT: 13.9 % (ref 11.5–14.5)
EST. GFR  (AFRICAN AMERICAN): >60 ML/MIN/1.73 M^2
EST. GFR  (NON AFRICAN AMERICAN): >60 ML/MIN/1.73 M^2
GLUCOSE SERPL-MCNC: 104 MG/DL (ref 70–110)
GLUCOSE UR QL STRIP: NEGATIVE
HCT VFR BLD AUTO: 49.4 % (ref 37–48.5)
HGB BLD-MCNC: 16.7 G/DL (ref 12–16)
HGB UR QL STRIP: ABNORMAL
IMM GRANULOCYTES # BLD AUTO: 0.1 K/UL (ref 0–0.04)
IMM GRANULOCYTES NFR BLD AUTO: 0.6 % (ref 0–0.5)
KETONES UR QL STRIP: ABNORMAL
LEUKOCYTE ESTERASE UR QL STRIP: NEGATIVE
LYMPHOCYTES # BLD AUTO: 2.6 K/UL (ref 1–4.8)
LYMPHOCYTES NFR BLD: 16 % (ref 18–48)
MCH RBC QN AUTO: 31.8 PG (ref 27–31)
MCHC RBC AUTO-ENTMCNC: 33.8 G/DL (ref 32–36)
MCV RBC AUTO: 94 FL (ref 82–98)
MICROSCOPIC COMMENT: ABNORMAL
MONOCYTES # BLD AUTO: 0.8 K/UL (ref 0.3–1)
MONOCYTES NFR BLD: 5.1 % (ref 4–15)
NEUTROPHILS # BLD AUTO: 12.4 K/UL (ref 1.8–7.7)
NEUTROPHILS NFR BLD: 76.8 % (ref 38–73)
NITRITE UR QL STRIP: NEGATIVE
NRBC BLD-RTO: 0 /100 WBC
PH UR STRIP: 6 [PH] (ref 5–8)
PLATELET # BLD AUTO: 280 K/UL (ref 150–450)
PMV BLD AUTO: 10.9 FL (ref 9.2–12.9)
POTASSIUM SERPL-SCNC: 3.7 MMOL/L (ref 3.5–5.1)
PROT SERPL-MCNC: 8.5 G/DL (ref 6–8.4)
PROT UR QL STRIP: NEGATIVE
RBC # BLD AUTO: 5.25 M/UL (ref 4–5.4)
RBC #/AREA URNS HPF: 5 /HPF (ref 0–4)
SARS-COV-2 RDRP RESP QL NAA+PROBE: NEGATIVE
SODIUM SERPL-SCNC: 138 MMOL/L (ref 136–145)
SP GR UR STRIP: 1.02 (ref 1–1.03)
TROPONIN I SERPL DL<=0.01 NG/ML-MCNC: <0.006 NG/ML (ref 0–0.03)
URN SPEC COLLECT METH UR: ABNORMAL
UROBILINOGEN UR STRIP-ACNC: NEGATIVE EU/DL
WBC # BLD AUTO: 16.08 K/UL (ref 3.9–12.7)

## 2021-05-18 PROCEDURE — 85025 COMPLETE CBC W/AUTO DIFF WBC: CPT | Performed by: EMERGENCY MEDICINE

## 2021-05-18 PROCEDURE — 93005 ELECTROCARDIOGRAM TRACING: CPT

## 2021-05-18 PROCEDURE — 80053 COMPREHEN METABOLIC PANEL: CPT | Performed by: EMERGENCY MEDICINE

## 2021-05-18 PROCEDURE — U0002 COVID-19 LAB TEST NON-CDC: HCPCS | Performed by: EMERGENCY MEDICINE

## 2021-05-18 PROCEDURE — 93010 EKG 12-LEAD: ICD-10-PCS | Mod: ,,, | Performed by: INTERNAL MEDICINE

## 2021-05-18 PROCEDURE — 96361 HYDRATE IV INFUSION ADD-ON: CPT

## 2021-05-18 PROCEDURE — 25500020 PHARM REV CODE 255: Performed by: EMERGENCY MEDICINE

## 2021-05-18 PROCEDURE — 25000003 PHARM REV CODE 250: Performed by: EMERGENCY MEDICINE

## 2021-05-18 PROCEDURE — 83880 ASSAY OF NATRIURETIC PEPTIDE: CPT | Performed by: EMERGENCY MEDICINE

## 2021-05-18 PROCEDURE — 81025 URINE PREGNANCY TEST: CPT | Performed by: EMERGENCY MEDICINE

## 2021-05-18 PROCEDURE — 84484 ASSAY OF TROPONIN QUANT: CPT | Performed by: EMERGENCY MEDICINE

## 2021-05-18 PROCEDURE — 93010 ELECTROCARDIOGRAM REPORT: CPT | Mod: ,,, | Performed by: INTERNAL MEDICINE

## 2021-05-18 PROCEDURE — 82550 ASSAY OF CK (CPK): CPT | Performed by: EMERGENCY MEDICINE

## 2021-05-18 PROCEDURE — 81000 URINALYSIS NONAUTO W/SCOPE: CPT | Performed by: EMERGENCY MEDICINE

## 2021-05-18 PROCEDURE — 63600175 PHARM REV CODE 636 W HCPCS: Performed by: EMERGENCY MEDICINE

## 2021-05-18 PROCEDURE — 99285 EMERGENCY DEPT VISIT HI MDM: CPT | Mod: 25

## 2021-05-18 PROCEDURE — 96374 THER/PROPH/DIAG INJ IV PUSH: CPT

## 2021-05-18 RX ORDER — NITROFURANTOIN 25; 75 MG/1; MG/1
100 CAPSULE ORAL 2 TIMES DAILY
Qty: 10 CAPSULE | Refills: 0 | Status: SHIPPED | OUTPATIENT
Start: 2021-05-18 | End: 2021-05-23

## 2021-05-18 RX ORDER — HYDROXYZINE HYDROCHLORIDE 25 MG/1
25 TABLET, FILM COATED ORAL EVERY 6 HOURS
Qty: 12 TABLET | Refills: 0 | Status: SHIPPED | OUTPATIENT
Start: 2021-05-18

## 2021-05-18 RX ORDER — MORPHINE SULFATE 4 MG/ML
4 INJECTION, SOLUTION INTRAMUSCULAR; INTRAVENOUS
Status: COMPLETED | OUTPATIENT
Start: 2021-05-18 | End: 2021-05-18

## 2021-05-18 RX ADMIN — IOHEXOL 100 ML: 350 INJECTION, SOLUTION INTRAVENOUS at 03:05

## 2021-05-18 RX ADMIN — MORPHINE SULFATE 4 MG: 4 INJECTION, SOLUTION INTRAMUSCULAR; INTRAVENOUS at 12:05

## 2021-05-18 RX ADMIN — SODIUM CHLORIDE 1000 ML: 0.9 INJECTION, SOLUTION INTRAVENOUS at 12:05

## 2021-07-01 ENCOUNTER — PATIENT MESSAGE (OUTPATIENT)
Dept: ADMINISTRATIVE | Facility: OTHER | Age: 36
End: 2021-07-01

## 2023-04-16 ENCOUNTER — HOSPITAL ENCOUNTER (EMERGENCY)
Facility: HOSPITAL | Age: 38
Discharge: HOME | End: 2023-04-16
Attending: EMERGENCY MEDICINE
Payer: MEDICAID

## 2023-04-16 VITALS
WEIGHT: 240.88 LBS | OXYGEN SATURATION: 98 % | SYSTOLIC BLOOD PRESSURE: 166 MMHG | TEMPERATURE: 99 F | RESPIRATION RATE: 16 BRPM | BODY MASS INDEX: 42.67 KG/M2 | HEART RATE: 88 BPM | DIASTOLIC BLOOD PRESSURE: 96 MMHG

## 2023-04-16 DIAGNOSIS — N83.202 CYST OF LEFT OVARY: ICD-10-CM

## 2023-04-16 DIAGNOSIS — R10.2 PELVIC PAIN: Primary | ICD-10-CM

## 2023-04-16 LAB
ALBUMIN SERPL BCP-MCNC: 3.8 G/DL (ref 3.5–5.2)
ALP SERPL-CCNC: 84 U/L (ref 55–135)
ALT SERPL W/O P-5'-P-CCNC: 18 U/L (ref 10–44)
ANION GAP SERPL CALC-SCNC: 11 MMOL/L (ref 8–16)
AST SERPL-CCNC: 16 U/L (ref 10–40)
B-HCG UR QL: NEGATIVE
BASOPHILS # BLD AUTO: 0.06 K/UL (ref 0–0.2)
BASOPHILS NFR BLD: 0.6 % (ref 0–1.9)
BILIRUB SERPL-MCNC: 0.4 MG/DL (ref 0.1–1)
BILIRUB UR QL STRIP: NEGATIVE
BUN SERPL-MCNC: 7 MG/DL (ref 6–20)
CALCIUM SERPL-MCNC: 9 MG/DL (ref 8.7–10.5)
CHLORIDE SERPL-SCNC: 106 MMOL/L (ref 95–110)
CLARITY UR: CLEAR
CO2 SERPL-SCNC: 24 MMOL/L (ref 23–29)
COLOR UR: COLORLESS
CREAT SERPL-MCNC: 0.7 MG/DL (ref 0.5–1.4)
DIFFERENTIAL METHOD: ABNORMAL
EOSINOPHIL # BLD AUTO: 0.1 K/UL (ref 0–0.5)
EOSINOPHIL NFR BLD: 1.1 % (ref 0–8)
ERYTHROCYTE [DISTWIDTH] IN BLOOD BY AUTOMATED COUNT: 14.5 % (ref 11.5–14.5)
EST. GFR  (NO RACE VARIABLE): >60 ML/MIN/1.73 M^2
GLUCOSE SERPL-MCNC: 109 MG/DL (ref 70–110)
GLUCOSE UR QL STRIP: NEGATIVE
HCT VFR BLD AUTO: 40.4 % (ref 37–48.5)
HGB BLD-MCNC: 13.8 G/DL (ref 12–16)
HGB UR QL STRIP: ABNORMAL
IMM GRANULOCYTES # BLD AUTO: 0.09 K/UL (ref 0–0.04)
IMM GRANULOCYTES NFR BLD AUTO: 0.9 % (ref 0–0.5)
KETONES UR QL STRIP: NEGATIVE
LEUKOCYTE ESTERASE UR QL STRIP: NEGATIVE
LIPASE SERPL-CCNC: 10 U/L (ref 4–60)
LYMPHOCYTES # BLD AUTO: 1.6 K/UL (ref 1–4.8)
LYMPHOCYTES NFR BLD: 15.5 % (ref 18–48)
MCH RBC QN AUTO: 33.3 PG (ref 27–31)
MCHC RBC AUTO-ENTMCNC: 34.2 G/DL (ref 32–36)
MCV RBC AUTO: 98 FL (ref 82–98)
MONOCYTES # BLD AUTO: 0.4 K/UL (ref 0.3–1)
MONOCYTES NFR BLD: 4 % (ref 4–15)
NEUTROPHILS # BLD AUTO: 8.2 K/UL (ref 1.8–7.7)
NEUTROPHILS NFR BLD: 77.9 % (ref 38–73)
NITRITE UR QL STRIP: NEGATIVE
NRBC BLD-RTO: 0 /100 WBC
PH UR STRIP: 6 [PH] (ref 5–8)
PLATELET # BLD AUTO: 230 K/UL (ref 150–450)
PMV BLD AUTO: 10.9 FL (ref 9.2–12.9)
POTASSIUM SERPL-SCNC: 3.1 MMOL/L (ref 3.5–5.1)
PROT SERPL-MCNC: 7.2 G/DL (ref 6–8.4)
PROT UR QL STRIP: NEGATIVE
RBC # BLD AUTO: 4.14 M/UL (ref 4–5.4)
SODIUM SERPL-SCNC: 141 MMOL/L (ref 136–145)
SP GR UR STRIP: 1 (ref 1–1.03)
URN SPEC COLLECT METH UR: ABNORMAL
UROBILINOGEN UR STRIP-ACNC: NEGATIVE EU/DL
WBC # BLD AUTO: 10.53 K/UL (ref 3.9–12.7)

## 2023-04-16 PROCEDURE — 63600175 PHARM REV CODE 636 W HCPCS: Performed by: NURSE PRACTITIONER

## 2023-04-16 PROCEDURE — 80053 COMPREHEN METABOLIC PANEL: CPT | Performed by: NURSE PRACTITIONER

## 2023-04-16 PROCEDURE — 96374 THER/PROPH/DIAG INJ IV PUSH: CPT | Mod: 59

## 2023-04-16 PROCEDURE — 81025 URINE PREGNANCY TEST: CPT | Performed by: NURSE PRACTITIONER

## 2023-04-16 PROCEDURE — 81003 URINALYSIS AUTO W/O SCOPE: CPT | Performed by: NURSE PRACTITIONER

## 2023-04-16 PROCEDURE — 99285 EMERGENCY DEPT VISIT HI MDM: CPT | Mod: 25

## 2023-04-16 PROCEDURE — 96375 TX/PRO/DX INJ NEW DRUG ADDON: CPT

## 2023-04-16 PROCEDURE — 25000003 PHARM REV CODE 250: Performed by: NURSE PRACTITIONER

## 2023-04-16 PROCEDURE — 85025 COMPLETE CBC W/AUTO DIFF WBC: CPT | Performed by: NURSE PRACTITIONER

## 2023-04-16 PROCEDURE — 83690 ASSAY OF LIPASE: CPT | Performed by: NURSE PRACTITIONER

## 2023-04-16 PROCEDURE — 96361 HYDRATE IV INFUSION ADD-ON: CPT

## 2023-04-16 PROCEDURE — 25500020 PHARM REV CODE 255: Performed by: EMERGENCY MEDICINE

## 2023-04-16 RX ORDER — HYDROCODONE BITARTRATE AND ACETAMINOPHEN 10; 325 MG/1; MG/1
1 TABLET ORAL EVERY 6 HOURS PRN
Qty: 16 TABLET | Refills: 0 | Status: SHIPPED | OUTPATIENT
Start: 2023-04-16

## 2023-04-16 RX ORDER — ONDANSETRON 2 MG/ML
4 INJECTION INTRAMUSCULAR; INTRAVENOUS
Status: COMPLETED | OUTPATIENT
Start: 2023-04-16 | End: 2023-04-16

## 2023-04-16 RX ORDER — MORPHINE SULFATE 4 MG/ML
4 INJECTION, SOLUTION INTRAMUSCULAR; INTRAVENOUS
Status: COMPLETED | OUTPATIENT
Start: 2023-04-16 | End: 2023-04-16

## 2023-04-16 RX ORDER — HYDROMORPHONE HYDROCHLORIDE 2 MG/ML
1 INJECTION, SOLUTION INTRAMUSCULAR; INTRAVENOUS; SUBCUTANEOUS
Status: COMPLETED | OUTPATIENT
Start: 2023-04-16 | End: 2023-04-16

## 2023-04-16 RX ORDER — ONDANSETRON 4 MG/1
4 TABLET, FILM COATED ORAL EVERY 6 HOURS PRN
Qty: 20 TABLET | Refills: 0 | Status: SHIPPED | OUTPATIENT
Start: 2023-04-16

## 2023-04-16 RX ADMIN — ONDANSETRON 4 MG: 2 INJECTION INTRAMUSCULAR; INTRAVENOUS at 12:04

## 2023-04-16 RX ADMIN — IOHEXOL 100 ML: 350 INJECTION, SOLUTION INTRAVENOUS at 02:04

## 2023-04-16 RX ADMIN — SODIUM CHLORIDE 1000 ML: 9 INJECTION, SOLUTION INTRAVENOUS at 12:04

## 2023-04-16 RX ADMIN — HYDROMORPHONE HYDROCHLORIDE 1 MG: 2 INJECTION INTRAMUSCULAR; INTRAVENOUS; SUBCUTANEOUS at 02:04

## 2023-04-16 RX ADMIN — MORPHINE SULFATE 4 MG: 4 INJECTION INTRAVENOUS at 12:04

## 2023-04-16 NOTE — ED PROVIDER NOTES
HISTORY     Chief Complaint   Patient presents with    Pelvic Pain     Thinks her implanted birth control has dislodged. Several lower abdominal pain since yesterday morning      Review of patient's allergies indicates:   Allergen Reactions    Antihistamines - alkylamine Anaphylaxis    Augmentin [amoxicillin-pot clavulanate] Nausea And Vomiting    Biaxin [clarithromycin] Nausea And Vomiting    Ceclor [cefaclor] Nausea And Vomiting    Ceftin [cefuroxime axetil] Nausea And Vomiting    Rocephin [ceftriaxone] Rash    Zithromax [azithromycin] Rash        HPI   The history is provided by the patient. No  was used.   Female  Problem  Primary symptoms include pelvic pain.     Primary symptoms include no discharge, no fever, no dyspareunia, no genital lesions, no genital pain, no genital rash, no genital itching, no genital odor, no dysuria, and no vaginal bleeding.   This is a new problem. The current episode started yesterday. The problem occurs constantly. The problem has been unchanged. Associated symptoms include abdominal pain. Pertinent negatives include no nausea and no vomiting.      PCP: Yamilet Eid MD     Past Medical History:  No past medical history on file.     Past Surgical History:  Past Surgical History:   Procedure Laterality Date    APPENDECTOMY      BACK SURGERY      bone spur removal    CHOLECYSTECTOMY      MANDIBLE SURGERY      SINUS SURGERY      TONSILLECTOMY      adenoids        Family History:  Family History   Problem Relation Age of Onset    Colon cancer Paternal Grandfather     Hypertension Father     Hyperlipidemia Father         Social History:  Social History     Tobacco Use    Smoking status: Every Day    Smokeless tobacco: Never   Substance and Sexual Activity    Alcohol use: Yes    Drug use: No    Sexual activity: Not on file         ROS   Review of Systems   Constitutional:  Negative for fever.   HENT:  Negative for sore throat.    Respiratory:  Negative for  shortness of breath.    Cardiovascular:  Negative for chest pain.   Gastrointestinal:  Positive for abdominal pain. Negative for nausea and vomiting.   Genitourinary:  Positive for pelvic pain. Negative for dyspareunia, dysuria and vaginal bleeding.   Musculoskeletal:  Negative for back pain.   Skin:  Negative for rash.   Neurological:  Negative for weakness.   Hematological:  Does not bruise/bleed easily.     PHYSICAL EXAM     Initial Vitals   BP Pulse Resp Temp SpO2   04/16/23 1046 04/16/23 1045 04/16/23 1045 04/16/23 1045 04/16/23 1045   (!) 189/109 100 18 99 °F (37.2 °C) 100 %      MAP       --                  Physical Exam    Nursing note and vitals reviewed.  Constitutional: She appears well-developed and well-nourished. She is not diaphoretic. No distress.   HENT:   Head: Normocephalic and atraumatic.   Eyes: Right eye exhibits no discharge. Left eye exhibits no discharge.   Neck: Neck supple.   Normal range of motion.  Cardiovascular:  Normal rate.           Pulmonary/Chest: No respiratory distress.   Abdominal: Abdomen is soft. She exhibits no distension. There is abdominal tenderness (Mid upper pelvic/lower abdomen).   Musculoskeletal:         General: Normal range of motion.      Cervical back: Normal range of motion and neck supple.     Neurological: She is alert and oriented to person, place, and time. She has normal strength.   Skin: Skin is warm and dry.   Psychiatric: She has a normal mood and affect. Her behavior is normal. Thought content normal.        ED COURSE   Procedures  ED ONGOING VITALS:  Vitals:    04/16/23 1045 04/16/23 1046 04/16/23 1208 04/16/23 1437   BP:  (!) 189/109     Pulse: 100      Resp: 18  16 16   Temp: 99 °F (37.2 °C)      TempSrc: Oral      SpO2: 100%      Weight: 109.2 kg (240 lb 13.6 oz)       04/16/23 1440   BP: (!) 166/96   Pulse: 88   Resp: 16   Temp:    TempSrc:    SpO2: 98%   Weight:          ABNORMAL LAB VALUES:  Labs Reviewed   CBC W/ AUTO DIFFERENTIAL - Abnormal;  Notable for the following components:       Result Value    MCH 33.3 (*)     Immature Granulocytes 0.9 (*)     Gran # (ANC) 8.2 (*)     Immature Grans (Abs) 0.09 (*)     Gran % 77.9 (*)     Lymph % 15.5 (*)     All other components within normal limits   COMPREHENSIVE METABOLIC PANEL - Abnormal; Notable for the following components:    Potassium 3.1 (*)     All other components within normal limits   URINALYSIS, REFLEX TO URINE CULTURE - Abnormal; Notable for the following components:    Color, UA Colorless (*)     Occult Blood UA Trace (*)     All other components within normal limits    Narrative:     Specimen Source->Urine   LIPASE   PREGNANCY TEST, URINE RAPID    Narrative:     Specimen Source->Urine         ALL LAB VALUES:  Results for orders placed or performed during the hospital encounter of 04/16/23   CBC auto differential   Result Value Ref Range    WBC 10.53 3.90 - 12.70 K/uL    RBC 4.14 4.00 - 5.40 M/uL    Hemoglobin 13.8 12.0 - 16.0 g/dL    Hematocrit 40.4 37.0 - 48.5 %    MCV 98 82 - 98 fL    MCH 33.3 (H) 27.0 - 31.0 pg    MCHC 34.2 32.0 - 36.0 g/dL    RDW 14.5 11.5 - 14.5 %    Platelets 230 150 - 450 K/uL    MPV 10.9 9.2 - 12.9 fL    Immature Granulocytes 0.9 (H) 0.0 - 0.5 %    Gran # (ANC) 8.2 (H) 1.8 - 7.7 K/uL    Immature Grans (Abs) 0.09 (H) 0.00 - 0.04 K/uL    Lymph # 1.6 1.0 - 4.8 K/uL    Mono # 0.4 0.3 - 1.0 K/uL    Eos # 0.1 0.0 - 0.5 K/uL    Baso # 0.06 0.00 - 0.20 K/uL    nRBC 0 0 /100 WBC    Gran % 77.9 (H) 38.0 - 73.0 %    Lymph % 15.5 (L) 18.0 - 48.0 %    Mono % 4.0 4.0 - 15.0 %    Eosinophil % 1.1 0.0 - 8.0 %    Basophil % 0.6 0.0 - 1.9 %    Differential Method Automated    Comprehensive metabolic panel   Result Value Ref Range    Sodium 141 136 - 145 mmol/L    Potassium 3.1 (L) 3.5 - 5.1 mmol/L    Chloride 106 95 - 110 mmol/L    CO2 24 23 - 29 mmol/L    Glucose 109 70 - 110 mg/dL    BUN 7 6 - 20 mg/dL    Creatinine 0.7 0.5 - 1.4 mg/dL    Calcium 9.0 8.7 - 10.5 mg/dL    Total Protein 7.2  6.0 - 8.4 g/dL    Albumin 3.8 3.5 - 5.2 g/dL    Total Bilirubin 0.4 0.1 - 1.0 mg/dL    Alkaline Phosphatase 84 55 - 135 U/L    AST 16 10 - 40 U/L    ALT 18 10 - 44 U/L    Anion Gap 11 8 - 16 mmol/L    eGFR >60 >60 mL/min/1.73 m^2   Lipase   Result Value Ref Range    Lipase 10 4 - 60 U/L   Urinalysis, Reflex to Urine Culture Urine, Clean Catch    Specimen: Urine   Result Value Ref Range    Specimen UA Urine, Clean Catch     Color, UA Colorless (A) Yellow, Straw, Zeinab    Appearance, UA Clear Clear    pH, UA 6.0 5.0 - 8.0    Specific Gravity, UA 1.005 1.005 - 1.030    Protein, UA Negative Negative    Glucose, UA Negative Negative    Ketones, UA Negative Negative    Bilirubin (UA) Negative Negative    Occult Blood UA Trace (A) Negative    Nitrite, UA Negative Negative    Urobilinogen, UA Negative <2.0 EU/dL    Leukocytes, UA Negative Negative   Pregnancy, urine rapid (UPT)   Result Value Ref Range    Preg Test, Ur Negative            RADIOLOGY STUDIES:  Imaging Results              CT Abdomen Pelvis With Contrast (Final result)  Result time 04/16/23 14:44:44      Final result by Wm Boone MD (04/16/23 14:44:44)                   Impression:      Complex appearing mixed cystic and solid lesion in the left adnexa measuring up to 4 cm.  Surrounding small volume of relatively high attenuation fluid extending into the pelvis and right pericolic gutter may represent blood, possible ruptured hemorrhagic cyst.  Gynecologic evaluation recommended.    Wall thickening of the distal sigmoid colon may be due to underdistention and/or secondary to the adjacent left adnexal process versus primary colitis.      Electronically signed by: Wm Boone  Date:    04/16/2023  Time:    14:44               Narrative:    EXAMINATION:  CT ABDOMEN PELVIS WITH CONTRAST    CLINICAL HISTORY:  Abdominal abscess/infection suspected;    TECHNIQUE:  Low dose axial images, sagittal and coronal reformations were obtained from the lung  bases to the pubic symphysis following the IV administration of 100 mL of Omnipaque 350 .  Oral contrast was not given.    All CT scans at this location are performed using dose optimization techniques including the following: Automated exposure control; adjustment of the mA and/or kv; use of iterative reconstruction technique.    COMPARISON:  CT dated 04/03/2018    FINDINGS:  Liver is normal in size, contour, and enhancement.  No focal lesion.  Portal venous system appears patent.    Gallbladder is surgically absent.  Biliary tree is otherwise unremarkable.    Spleen is normal in size and enhancement.  No focal lesion.    Pancreas is normal in size, contour, and enhancement.  No focal lesion or ductal dilatation.    Adrenal glands are unremarkable.    Kidneys are normal in size and enhancement.  No focal lesion, urolithiasis, or hydroureteronephrosis.    Appendix appears surgically absent.  There is long segment wall thickening throughout the distal sigmoid colon which is relatively collapsed.  Remainder of the gastrointestinal tract is unremarkable.  No evidence of obstruction.    Urinary bladder is unremarkable.    T-shaped intrauterine device is in place.  Uterus is unremarkable in appearance.  There is a complex mixed cystic and solid lesion in the left adnexa measuring up to 4 cm.  There is a surrounding small volume of relatively high attenuation fluid extending into the pelvis and right pericolic gutter.    Major vessels of the abdomen and pelvis appear within normal limits.    No suspicious lymphadenopathy.    No significant pneumoperitoneum or peritoneal implant.    No acute bony abnormality.  No aggressive lytic or blastic lesion.  Degenerative changes noted in the spine and pelvis.    Visualized lower lungs are clear aside from minimal bibasilar subsegmental atelectasis.                                       US Pelvis Comp with Transvag NON-OB (xpd) (Final result)  Result time 04/16/23 14:37:54    Procedure changed from US Pelvis Complete Non OB     Final result by Wm Boone MD (04/16/23 14:37:54)                   Impression:      Heterogeneous appearance of the left ovary with a complex appearing 2.9 x 2.0 x 3.0 cm mixed cystic and solid structure.  Recommend gynecologic evaluation with short interval follow-up ultrasound versus pelvic MRI as clinically indicated.      Electronically signed by: Wm Boone  Date:    04/16/2023  Time:    14:37               Narrative:    EXAMINATION:  US PELVIS COMP WITH TRANSVAG NON-OB (XPD)    CLINICAL HISTORY:  pelvic pain;    TECHNIQUE:  Transabdominal sonography of the pelvis was performed, followed by transvaginal sonography to better evaluate the uterus and ovaries.    COMPARISON:  None.    FINDINGS:  Uterus:    Size: 7.2 x 2.6 x 4.9 cm    Masses: None    Endometrium: Normal in this pre menopausal patient, measuring 4 mm.  IUD appears appropriately position near the fundus.    Right ovary:    Size: 3.0 x 1.6 x 2.1 cm    Appearance: Normal    Vascular flow: Normal.    Left ovary:    Size: 4.8 x 2.9 x 4.2 cm    Appearance: Heterogeneous appearance with a 2.9 x 2.0 x 3.0 cm complex mixed cystic and solid structure.    Vascular Flow: Normal.    Free Fluid:    Small volume of free fluid.                                                The above vital signs and test results have been reviewed by the emergency provider.     ED Medications:  Current Discharge Medication List        START taking these medications    Details   HYDROcodone-acetaminophen (NORCO)  mg per tablet Take 1 tablet by mouth every 6 (six) hours as needed for Pain.  Qty: 16 tablet, Refills: 0    Comments: Quantity prescribed more than 7 day supply? No      ondansetron (ZOFRAN) 4 MG tablet Take 1 tablet (4 mg total) by mouth every 6 (six) hours as needed for Nausea.  Qty: 20 tablet, Refills: 0           Discharge Medications:  New Prescriptions    HYDROCODONE-ACETAMINOPHEN (NORCO)   MG PER TABLET    Take 1 tablet by mouth every 6 (six) hours as needed for Pain.    ONDANSETRON (ZOFRAN) 4 MG TABLET    Take 1 tablet (4 mg total) by mouth every 6 (six) hours as needed for Nausea.       Follow-up Information       Schedule an appointment as soon as possible for a visit  with [facility].    Specialty: Obstetrics and Gynecology  Contact information:  [address] [facility] [address] [phone]  Additional information:  Please take [address] for the Medical Office Building. Check in on the 3rd floor, to the right.             [facility]..    Specialty: Emergency Medicine  Why: If symptoms worsen  Contact information:  [address] [facility] [address] [phone]                          3:23 PM: I  discussed the pt's case with Dr. Obando (obgyn) who recommends patient being followed up with her outpatient OBGYN..    I discussed with patient and/or family/caretaker that evaluation in the ED does not suggest any emergent or life threatening medical conditions requiring immediate intervention beyond what was provided in the ED, and I believe patient is safe for discharge. Regardless, an unremarkable evaluation in the ED does not preclude the development or presence of a serious or life threatening condition. As such, patient was instructed to return immediately for any worsening or change in current symptoms.      MEDICAL DECISION MAKING   Medical Decision Making:   Initial Assessment:   Patient presents for mid lower abdomen/upper pelvic pain.  Patient reports she is concerned about possible IUD misplacement.  Differential Diagnosis:   Torsion  IUD misplacement  Ectopic pregnancy   UTI  Appendicitis  Clinical Tests:   Lab Tests: Ordered and Reviewed  Radiological Study: Ordered and Reviewed  ED Management:  Patient is to follow up with her OBGYN.  Patient is to return to the ER for any worsening concerns.              CLINICAL IMPRESSION       ICD-10-CM ICD-9-CM   1. Pelvic pain  R10.2 [id number]   2. Cyst of left ovary  N83.202 620.2       Disposition:   Disposition: Discharged  Condition: Stable       Octavio Jimenez NP  04/16/23 1525       Octavio Jimenez NP  04/16/23 1526

## 2023-04-18 ENCOUNTER — HOSPITAL ENCOUNTER (EMERGENCY)
Facility: HOSPITAL | Age: 38
Discharge: HOME | End: 2023-04-18
Attending: EMERGENCY MEDICINE
Payer: MEDICAID

## 2023-04-18 ENCOUNTER — TELEPHONE (OUTPATIENT)
Dept: EMERGENCY MEDICINE | Facility: HOSPITAL | Age: 38
End: 2023-04-18
Payer: MEDICAID

## 2023-04-18 VITALS
RESPIRATION RATE: 20 BRPM | OXYGEN SATURATION: 98 % | SYSTOLIC BLOOD PRESSURE: 156 MMHG | BODY MASS INDEX: 43.9 KG/M2 | DIASTOLIC BLOOD PRESSURE: 108 MMHG | WEIGHT: 247.81 LBS | HEART RATE: 88 BPM | TEMPERATURE: 98 F

## 2023-04-18 DIAGNOSIS — N83.209 RUPTURED OVARIAN CYST: Primary | ICD-10-CM

## 2023-04-18 DIAGNOSIS — Z72.0 TOBACCO ABUSE DISORDER: ICD-10-CM

## 2023-04-18 DIAGNOSIS — E66.01 MORBID OBESITY: ICD-10-CM

## 2023-04-18 DIAGNOSIS — I10 CHRONIC HYPERTENSION: ICD-10-CM

## 2023-04-18 DIAGNOSIS — R10.2 PELVIC PAIN: ICD-10-CM

## 2023-04-18 LAB
ALBUMIN SERPL BCP-MCNC: 3.5 G/DL (ref 3.5–5.2)
ALP SERPL-CCNC: 78 U/L (ref 55–135)
ALT SERPL W/O P-5'-P-CCNC: 19 U/L (ref 10–44)
AMPHET+METHAMPHET UR QL: NEGATIVE
ANION GAP SERPL CALC-SCNC: 11 MMOL/L (ref 8–16)
AST SERPL-CCNC: 20 U/L (ref 10–40)
B-HCG UR QL: NEGATIVE
BARBITURATES UR QL SCN>200 NG/ML: NEGATIVE
BASOPHILS # BLD AUTO: 0.06 K/UL (ref 0–0.2)
BASOPHILS NFR BLD: 0.6 % (ref 0–1.9)
BENZODIAZ UR QL SCN>200 NG/ML: NEGATIVE
BILIRUB SERPL-MCNC: 0.5 MG/DL (ref 0.1–1)
BILIRUB UR QL STRIP: NEGATIVE
BUN SERPL-MCNC: 8 MG/DL (ref 6–20)
BZE UR QL SCN: NEGATIVE
CALCIUM SERPL-MCNC: 8.8 MG/DL (ref 8.7–10.5)
CANNABINOIDS UR QL SCN: ABNORMAL
CHLORIDE SERPL-SCNC: 102 MMOL/L (ref 95–110)
CLARITY UR: CLEAR
CO2 SERPL-SCNC: 27 MMOL/L (ref 23–29)
COLOR UR: YELLOW
CREAT SERPL-MCNC: 0.7 MG/DL (ref 0.5–1.4)
CREAT UR-MCNC: 71.9 MG/DL (ref 15–325)
DIFFERENTIAL METHOD: ABNORMAL
EOSINOPHIL # BLD AUTO: 0.2 K/UL (ref 0–0.5)
EOSINOPHIL NFR BLD: 1.6 % (ref 0–8)
ERYTHROCYTE [DISTWIDTH] IN BLOOD BY AUTOMATED COUNT: 14.6 % (ref 11.5–14.5)
EST. GFR  (NO RACE VARIABLE): >60 ML/MIN/1.73 M^2
GLUCOSE SERPL-MCNC: 92 MG/DL (ref 70–110)
GLUCOSE UR QL STRIP: NEGATIVE
HCT VFR BLD AUTO: 40.2 % (ref 37–48.5)
HGB BLD-MCNC: 13.6 G/DL (ref 12–16)
HGB UR QL STRIP: ABNORMAL
IMM GRANULOCYTES # BLD AUTO: 0.09 K/UL (ref 0–0.04)
IMM GRANULOCYTES NFR BLD AUTO: 0.9 % (ref 0–0.5)
KETONES UR QL STRIP: NEGATIVE
LEUKOCYTE ESTERASE UR QL STRIP: NEGATIVE
LYMPHOCYTES # BLD AUTO: 1.6 K/UL (ref 1–4.8)
LYMPHOCYTES NFR BLD: 15.8 % (ref 18–48)
MCH RBC QN AUTO: 33.8 PG (ref 27–31)
MCHC RBC AUTO-ENTMCNC: 33.8 G/DL (ref 32–36)
MCV RBC AUTO: 100 FL (ref 82–98)
METHADONE UR QL SCN>300 NG/ML: NEGATIVE
MONOCYTES # BLD AUTO: 0.5 K/UL (ref 0.3–1)
MONOCYTES NFR BLD: 5.2 % (ref 4–15)
NEUTROPHILS # BLD AUTO: 7.8 K/UL (ref 1.8–7.7)
NEUTROPHILS NFR BLD: 75.9 % (ref 38–73)
NITRITE UR QL STRIP: NEGATIVE
NRBC BLD-RTO: 0 /100 WBC
OPIATES UR QL SCN: ABNORMAL
PCP UR QL SCN>25 NG/ML: NEGATIVE
PH UR STRIP: 8 [PH] (ref 5–8)
PLATELET # BLD AUTO: 227 K/UL (ref 150–450)
PMV BLD AUTO: 10.7 FL (ref 9.2–12.9)
POTASSIUM SERPL-SCNC: 3.8 MMOL/L (ref 3.5–5.1)
PROT SERPL-MCNC: 6.7 G/DL (ref 6–8.4)
PROT UR QL STRIP: NEGATIVE
RBC # BLD AUTO: 4.02 M/UL (ref 4–5.4)
SODIUM SERPL-SCNC: 140 MMOL/L (ref 136–145)
SP GR UR STRIP: 1.01 (ref 1–1.03)
TOXICOLOGY INFORMATION: ABNORMAL
URN SPEC COLLECT METH UR: ABNORMAL
UROBILINOGEN UR STRIP-ACNC: NEGATIVE EU/DL
WBC # BLD AUTO: 10.32 K/UL (ref 3.9–12.7)

## 2023-04-18 PROCEDURE — 96374 THER/PROPH/DIAG INJ IV PUSH: CPT

## 2023-04-18 PROCEDURE — 25000003 PHARM REV CODE 250: Performed by: NURSE PRACTITIONER

## 2023-04-18 PROCEDURE — 99285 EMERGENCY DEPT VISIT HI MDM: CPT | Mod: 25

## 2023-04-18 PROCEDURE — 85025 COMPLETE CBC W/AUTO DIFF WBC: CPT | Performed by: NURSE PRACTITIONER

## 2023-04-18 PROCEDURE — 81003 URINALYSIS AUTO W/O SCOPE: CPT | Mod: 59 | Performed by: EMERGENCY MEDICINE

## 2023-04-18 PROCEDURE — 80307 DRUG TEST PRSMV CHEM ANLYZR: CPT | Performed by: EMERGENCY MEDICINE

## 2023-04-18 PROCEDURE — 96361 HYDRATE IV INFUSION ADD-ON: CPT

## 2023-04-18 PROCEDURE — 80053 COMPREHEN METABOLIC PANEL: CPT | Performed by: NURSE PRACTITIONER

## 2023-04-18 PROCEDURE — 63600175 PHARM REV CODE 636 W HCPCS: Performed by: EMERGENCY MEDICINE

## 2023-04-18 PROCEDURE — 96375 TX/PRO/DX INJ NEW DRUG ADDON: CPT

## 2023-04-18 PROCEDURE — 81025 URINE PREGNANCY TEST: CPT | Performed by: EMERGENCY MEDICINE

## 2023-04-18 PROCEDURE — 25000003 PHARM REV CODE 250: Performed by: EMERGENCY MEDICINE

## 2023-04-18 RX ORDER — IBUPROFEN 600 MG/1
600 TABLET ORAL
Status: DISCONTINUED | OUTPATIENT
Start: 2023-04-18 | End: 2023-04-18

## 2023-04-18 RX ORDER — SODIUM CHLORIDE 9 MG/ML
1000 INJECTION, SOLUTION INTRAVENOUS
Status: COMPLETED | OUTPATIENT
Start: 2023-04-18 | End: 2023-04-18

## 2023-04-18 RX ORDER — ONDANSETRON 2 MG/ML
4 INJECTION INTRAMUSCULAR; INTRAVENOUS
Status: COMPLETED | OUTPATIENT
Start: 2023-04-18 | End: 2023-04-18

## 2023-04-18 RX ORDER — CLONIDINE HYDROCHLORIDE 0.1 MG/1
0.1 TABLET ORAL
Status: COMPLETED | OUTPATIENT
Start: 2023-04-18 | End: 2023-04-18

## 2023-04-18 RX ORDER — METOPROLOL SUCCINATE 50 MG/1
50 TABLET, EXTENDED RELEASE ORAL ONCE
Status: COMPLETED | OUTPATIENT
Start: 2023-04-18 | End: 2023-04-18

## 2023-04-18 RX ORDER — KETOROLAC TROMETHAMINE 30 MG/ML
30 INJECTION, SOLUTION INTRAMUSCULAR; INTRAVENOUS
Status: COMPLETED | OUTPATIENT
Start: 2023-04-18 | End: 2023-04-18

## 2023-04-18 RX ORDER — LISINOPRIL 10 MG/1
10 TABLET ORAL
Status: COMPLETED | OUTPATIENT
Start: 2023-04-18 | End: 2023-04-18

## 2023-04-18 RX ORDER — MORPHINE SULFATE 4 MG/ML
4 INJECTION, SOLUTION INTRAMUSCULAR; INTRAVENOUS
Status: COMPLETED | OUTPATIENT
Start: 2023-04-18 | End: 2023-04-18

## 2023-04-18 RX ORDER — NAPROXEN 500 MG/1
500 TABLET ORAL 2 TIMES DAILY WITH MEALS
Qty: 30 TABLET | Refills: 0 | Status: SHIPPED | OUTPATIENT
Start: 2023-04-18

## 2023-04-18 RX ADMIN — METOPROLOL SUCCINATE 50 MG: 50 TABLET, EXTENDED RELEASE ORAL at 11:04

## 2023-04-18 RX ADMIN — CLONIDINE HYDROCHLORIDE 0.1 MG: 0.1 TABLET ORAL at 11:04

## 2023-04-18 RX ADMIN — LISINOPRIL 10 MG: 10 TABLET ORAL at 11:04

## 2023-04-18 RX ADMIN — MORPHINE SULFATE 4 MG: 4 INJECTION INTRAVENOUS at 12:04

## 2023-04-18 RX ADMIN — ONDANSETRON 4 MG: 2 INJECTION INTRAMUSCULAR; INTRAVENOUS at 12:04

## 2023-04-18 RX ADMIN — SODIUM CHLORIDE 1000 ML: 9 INJECTION, SOLUTION INTRAVENOUS at 10:04

## 2023-04-18 RX ADMIN — KETOROLAC TROMETHAMINE 30 MG: 30 INJECTION, SOLUTION INTRAMUSCULAR; INTRAVENOUS at 11:04

## 2023-04-18 NOTE — ED PROVIDER NOTES
SCRIBE #1 NOTE: I, Ofelia Jin, am scribing for, and in the presence of, Keke Bishop MD. I have scribed the entire note.      History      Chief Complaint   Patient presents with    Abdominal Pain     Lower abd pain, seen here Sunday for cyst rupture. Pain is worse today.       Review of patient's allergies indicates:   Allergen Reactions    Antihistamines - alkylamine Anaphylaxis    Augmentin [amoxicillin-pot clavulanate] Nausea And Vomiting    Biaxin [clarithromycin] Nausea And Vomiting    Ceclor [cefaclor] Nausea And Vomiting    Ceftin [cefuroxime axetil] Nausea And Vomiting    Rocephin [ceftriaxone] Rash    Zithromax [azithromycin] Rash        HPI   HPI    4/18/2023, 10:13 AM   History obtained from the patient      History of Present Illness: Sarah Hernandez is a 37 y.o. female patient who presents to the Emergency Department for lower abdominal pain which onset gradually a few days ago and worsened early this morning. Pt reports that she was evaluated for this complaint in this ER on 4/16/23 and was dx with a ruptured ovarian cyst. She states that she attempted to make a f/u OBGYN appointment, but was told that she could not get an appointment until the end of the year. Pt is taking Norco for pain, but her pain worsened early this morning, so she came back to the ER for an evaluation.  Symptoms are constant and moderate in severity. No mitigating or exacerbating factors reported. Associated sxs include N/V and abdominal distention. Patient denies any fever, chills, vaginal bleeding, vaginal discharge, CP, SOB, weakness, and all other sxs at this time. Pt reports that she gets her yearly women's exam at her PCP's office. Also, pt was noted to be hypertensive and states that she did not take her blood pressure medications last night. No further complaints or concerns at this time.         Arrival mode: Personal vehicle    PCP: Yamilet Eid MD       Past Medical History:  No past medical history on  file.    Past Surgical History:  Past Surgical History:   Procedure Laterality Date    APPENDECTOMY      BACK SURGERY      bone spur removal    CHOLECYSTECTOMY      MANDIBLE SURGERY      SINUS SURGERY      TONSILLECTOMY      adenoids         Family History:  Family History   Problem Relation Age of Onset    Colon cancer Paternal Grandfather     Hypertension Father     Hyperlipidemia Father        Social History:  Social History     Tobacco Use    Smoking status: Every Day    Smokeless tobacco: Never   Substance and Sexual Activity    Alcohol use: Yes    Drug use: No    Sexual activity: Not on file       ROS   Review of Systems   Constitutional:  Negative for chills and fever.   HENT:  Negative for sore throat.    Respiratory:  Negative for shortness of breath.    Cardiovascular:  Negative for chest pain.   Gastrointestinal:  Positive for abdominal distention, abdominal pain (lower), nausea and vomiting.   Genitourinary:  Negative for dysuria, vaginal bleeding and vaginal discharge.   Musculoskeletal:  Negative for back pain.   Skin:  Negative for rash.   Neurological:  Negative for weakness.   Hematological:  Does not bruise/bleed easily.   All other systems reviewed and are negative.    Physical Exam      Initial Vitals [04/18/23 0945]   BP Pulse Resp Temp SpO2   (!) 200/119 100 20 98 °F (36.7 °C) 98 %      MAP       --          Physical Exam  Nursing Notes and Vital Signs Reviewed.  Constitutional: Patient is in no acute distress. Morbidly obese.  Head: Atraumatic. Normocephalic.  Eyes: PERRL. EOM intact. Conjunctivae are not pale. No scleral icterus.  ENT: Mucous membranes are moist. Oropharynx is clear and symmetric.    Neck: Supple. Full ROM. No lymphadenopathy.  Cardiovascular: Regular rate. Regular rhythm. No murmurs, rubs, or gallops. Distal pulses are 2+ and symmetric.  Pulmonary/Chest: No respiratory distress. Clear to auscultation bilaterally. No wheezing or rales.  Abdominal: Soft and non-distended.   There is suprapubic tenderness.  No rebound, guarding, or rigidity.   Musculoskeletal: Moves all extremities. No obvious deformities. No edema.  Skin: Warm and dry.  Neurological:  Alert, awake, and appropriate.  Normal speech.  No acute focal neurological deficits are appreciated.  Psychiatric: Normal affect. Good eye contact. Appropriate in content.    ED Course    Procedures  ED Vital Signs:  Vitals:    04/18/23 0945 04/18/23 1132 04/18/23 1216 04/18/23 1251   BP: (!) 200/119 (!) 187/118  (!) 156/108   Pulse: 100 86  88   Resp: 20 20 18 20   Temp: 98 °F (36.7 °C)      TempSrc: Oral      SpO2: 98% 99%  98%   Weight: 112.4 kg (247 lb 12.8 oz)          Abnormal Lab Results:  Labs Reviewed   CBC W/ AUTO DIFFERENTIAL - Abnormal; Notable for the following components:       Result Value     (*)     MCH 33.8 (*)     RDW 14.6 (*)     Immature Granulocytes 0.9 (*)     Gran # (ANC) 7.8 (*)     Immature Grans (Abs) 0.09 (*)     Gran % 75.9 (*)     Lymph % 15.8 (*)     All other components within normal limits   URINALYSIS, REFLEX TO URINE CULTURE - Abnormal; Notable for the following components:    Occult Blood UA Trace (*)     All other components within normal limits    Narrative:     Specimen Source->Urine   DRUG SCREEN PANEL, URINE EMERGENCY - Abnormal; Notable for the following components:    Opiate Scrn, Ur Presumptive Positive (*)     THC Presumptive Positive (*)     All other components within normal limits    Narrative:     Specimen Source->Urine   COMPREHENSIVE METABOLIC PANEL   PREGNANCY TEST, URINE RAPID    Narrative:     Specimen Source->Urine        All Lab Results:  Results for orders placed or performed during the hospital encounter of 04/18/23   CBC auto differential   Result Value Ref Range    WBC 10.32 3.90 - 12.70 K/uL    RBC 4.02 4.00 - 5.40 M/uL    Hemoglobin 13.6 12.0 - 16.0 g/dL    Hematocrit 40.2 37.0 - 48.5 %     (H) 82 - 98 fL    MCH 33.8 (H) 27.0 - 31.0 pg    MCHC 33.8 32.0 - 36.0  g/dL    RDW 14.6 (H) 11.5 - 14.5 %    Platelets 227 150 - 450 K/uL    MPV 10.7 9.2 - 12.9 fL    Immature Granulocytes 0.9 (H) 0.0 - 0.5 %    Gran # (ANC) 7.8 (H) 1.8 - 7.7 K/uL    Immature Grans (Abs) 0.09 (H) 0.00 - 0.04 K/uL    Lymph # 1.6 1.0 - 4.8 K/uL    Mono # 0.5 0.3 - 1.0 K/uL    Eos # 0.2 0.0 - 0.5 K/uL    Baso # 0.06 0.00 - 0.20 K/uL    nRBC 0 0 /100 WBC    Gran % 75.9 (H) 38.0 - 73.0 %    Lymph % 15.8 (L) 18.0 - 48.0 %    Mono % 5.2 4.0 - 15.0 %    Eosinophil % 1.6 0.0 - 8.0 %    Basophil % 0.6 0.0 - 1.9 %    Differential Method Automated    Comprehensive metabolic panel   Result Value Ref Range    Sodium 140 136 - 145 mmol/L    Potassium 3.8 3.5 - 5.1 mmol/L    Chloride 102 95 - 110 mmol/L    CO2 27 23 - 29 mmol/L    Glucose 92 70 - 110 mg/dL    BUN 8 6 - 20 mg/dL    Creatinine 0.7 0.5 - 1.4 mg/dL    Calcium 8.8 8.7 - 10.5 mg/dL    Total Protein 6.7 6.0 - 8.4 g/dL    Albumin 3.5 3.5 - 5.2 g/dL    Total Bilirubin 0.5 0.1 - 1.0 mg/dL    Alkaline Phosphatase 78 55 - 135 U/L    AST 20 10 - 40 U/L    ALT 19 10 - 44 U/L    Anion Gap 11 8 - 16 mmol/L    eGFR >60 >60 mL/min/1.73 m^2   Urinalysis, Reflex to Urine Culture Urine, Clean Catch    Specimen: Urine   Result Value Ref Range    Specimen UA Urine, Clean Catch     Color, UA Yellow Yellow, Straw, Zeinab    Appearance, UA Clear Clear    pH, UA 8.0 5.0 - 8.0    Specific Gravity, UA 1.015 1.005 - 1.030    Protein, UA Negative Negative    Glucose, UA Negative Negative    Ketones, UA Negative Negative    Bilirubin (UA) Negative Negative    Occult Blood UA Trace (A) Negative    Nitrite, UA Negative Negative    Urobilinogen, UA Negative <2.0 EU/dL    Leukocytes, UA Negative Negative   Pregnancy, urine rapid (UPT)   Result Value Ref Range    Preg Test, Ur Negative    Drug screen panel, emergency   Result Value Ref Range    Benzodiazepines Negative Negative    Methadone metabolites Negative Negative    Cocaine (Metab.) Negative Negative    Opiate Scrn, Ur Presumptive  Positive (A) Negative    Barbiturate Screen, Ur Negative Negative    Amphetamine Screen, Ur Negative Negative    THC Presumptive Positive (A) Negative    Phencyclidine Negative Negative    Creatinine, Urine 71.9 15.0 - 325.0 mg/dL    Toxicology Information SEE COMMENT          Imaging Results:  Imaging Results              US Pelvis Comp with Transvag NON-OB (xpd) (Final result)  Result time 04/18/23 12:10:32   Procedure changed from US Pelvis Complete Non OB     Final result by Gil Desir MD (04/18/23 12:10:32)                   Impression:      Above findings are likely related to a recently ruptured left ovarian hemorrhagic cyst.  No evidence of ovarian torsion.  Consider consultation with gynecology as clinically warranted. Otherwise, recommend follow-up pelvic ultrasound in 6-8 weeks.      Electronically signed by: Gil Desir  Date:    04/18/2023  Time:    12:10               Narrative:    EXAMINATION:  US PELVIS COMP WITH TRANSVAG NON-OB (XPD)    CLINICAL HISTORY:  pelvic pain;    TECHNIQUE:  Transabdominal sonography of the pelvis was performed, followed by transvaginal sonography to better evaluate the uterus and ovaries.    COMPARISON:  CT abdomen pelvis 04/16/2023.  Pelvic ultrasound 04/16/2023    FINDINGS:  Uterus:    Size: 6.1 x 2.9 x 2.5 cm    Masses: None    Endometrium: Normal in this pre menopausal patient, measuring 7 mm.  Satisfactory position of the IUD within the uterus.    Right ovary:    Size: 2.4 x 2.3 x 1.7 cm    Appearance: Normal    Vascular flow: Normal.    Left ovary:    Heterogeneous mildly enlarged left ovary measuring 3.9 x 3.1 x 2.1 cm, within normal internal flow signal. There is heterogeneous hypoechoic material surrounding the left ovary which when combined with the CT findings from 04/16/2023, represents congealed blood related to recently ruptured hemorrhagic cyst.    Vascular Flow: Normal.    Free Fluid:    Trace free fluid in the pelvis                                               The Emergency Provider reviewed the vital signs and test results, which are outlined above.    ED Discussion     1:18 PM: Reassessed pt at this time.  Discussed with pt all pertinent ED information and results. Discussed pt dx and plan of tx. Gave pt all f/u and return to the ED instructions. All questions and concerns were addressed at this time. Pt expresses understanding of information and instructions, and is comfortable with plan to discharge. Pt is stable for discharge.    I discussed with patient and/or family/caretaker that evaluation in the ED does not suggest any emergent or life threatening medical conditions requiring immediate intervention beyond what was provided in the ED, and I believe patient is safe for discharge.  Regardless, an unremarkable evaluation in the ED does not preclude the development or presence of a serious of life threatening condition. As such, patient was instructed to return immediately for any worsening or change in current symptoms.           ED Medication(s):  Medications   0.9%  NaCl infusion (0 mLs Intravenous Stopped 4/18/23 1206)   cloNIDine tablet 0.1 mg (0.1 mg Oral Given 4/18/23 1133)   ketorolac injection 30 mg (30 mg Intravenous Given 4/18/23 1131)   metoprolol succinate (TOPROL-XL) 24 hr tablet 50 mg (50 mg Oral Given 4/18/23 1133)   lisinopriL tablet 10 mg (10 mg Oral Given 4/18/23 1133)   morphine injection 4 mg (4 mg Intravenous Given 4/18/23 1216)   ondansetron injection 4 mg (4 mg Intravenous Given 4/18/23 1216)     Discharge Medication List as of 4/18/2023  1:07 PM        START taking these medications    Details   naproxen (NAPROSYN) 500 MG tablet Take 1 tablet (500 mg total) by mouth 2 (two) times daily with meals., Starting Tue 4/18/2023, Normal              Medication List        START taking these medications      naproxen 500 MG tablet  Commonly known as: NAPROSYN  Take 1 tablet (500 mg total) by mouth 2 (two) times daily with meals.             CHANGE how you take these medications      HYDROcodone-acetaminophen  mg per tablet  Commonly known as: NORCO  Take 1 tablet by mouth every 6 (six) hours as needed for Pain.  What changed: Another medication with the same name was removed. Continue taking this medication, and follow the directions you see here.            ASK your doctor about these medications      hydrOXYzine HCL 25 MG tablet  Commonly known as: ATARAX  Take 1 tablet (25 mg total) by mouth every 6 (six) hours.     ondansetron 4 MG tablet  Commonly known as: ZOFRAN  Take 1 tablet (4 mg total) by mouth every 6 (six) hours as needed for Nausea.     pantoprazole 40 MG tablet  Commonly known as: PROTONIX  Take 1 tablet (40 mg total) by mouth 2 (two) times daily.               Where to Get Your Medications        These medications were sent to [facility] [address]  [address] [place] [id number]      Phone: [phone]   naproxen 500 MG tablet            Follow-up Information       [facility]. Schedule an appointment as soon as possible for a visit in 2 days.    Specialty: Obstetrics and Gynecology  Why: Return to the Emergency Room, If symptoms worsen  Contact information:  [address] [facility] [address] [phone]                             Medical Decision Making    Medical Decision Making:   History:   Old Records Summarized: records from previous admission(s).       <> Summary of Records: Seen 2 days ago in the ER for pelvic pain, had lab work and CT scan along with pelvic US which showed complex ovarian cyst. Has not followed up with gynecology yet.   Clinical Tests:   Lab Tests: Ordered and Reviewed  Radiological Study: Ordered and Reviewed  ED Management:  Presents with worsening pelvic pain overnight, tenderness on exam noted, elevated BP noted but did not take home BP meds, lab work reviewed and otherwise normal, repeat pelvic US obtained  which was reviewed and shows ruptured hemorrhagic cyst per radiologist, patient re-evaluated, pain better controlled, BP better controlled, no indication for admission, feel she is stable for discharge home with outpatient gyn follow up.          Scribe Attestation:   Scribe #1: I performed the above scribed service and the documentation accurately describes the services I performed. I attest to the accuracy of the note.    Attending:   Physician Attestation Statement for Scribe #1: I, Keke Bishop MD, personally performed the services described in this documentation, as scribed by Ofelia Jin, in my presence, and it is both accurate and complete.          Clinical Impression       ICD-10-CM ICD-9-CM   1. Ruptured ovarian cyst  N83.209 620.2   2. Pelvic pain  R10.2 [id number]   3. Chronic hypertension  I10 401.9   4. Morbid obesity  E66.01 278.01   5. Tobacco abuse disorder  Z72.0 305.1       Disposition:   Disposition: Discharged  Condition: Stable       Keke Bishop MD  04/18/23 7040

## 2023-04-18 NOTE — TELEPHONE ENCOUNTER
Spoke to patient regarding follow up appt with OB/GYN. Patient states clinic cannot see her until end of year. Spoke to Medicaid Expansion Office who provided 4 providers for patient to call for follow up appt. Information given to patient.

## 2023-04-18 NOTE — FIRST PROVIDER EVALUATION
Medical screening examination initiated.  I have conducted a focused provider triage encounter, findings are as follows:    Brief history of present illness:  37-year-old female with complaint of worsening left lower abdominal pain over the past 3 days.  Patient reports she was diagnosed with a hemorrhagic cyst few days ago.  Patient reports pain worsening.    Vitals:    04/18/23 0945   BP: (!) 200/119  Comment: Pt unsure if she took BP meds last night,   BP Location: Right arm   Patient Position: Sitting   Pulse: 100   Resp: 20   Temp: 98 °F (36.7 °C)   TempSrc: Oral   SpO2: 98%   Weight: 112.4 kg (247 lb 12.8 oz)       Pertinent physical exam:left lower abdominal tenderness

## 2023-04-20 ENCOUNTER — PATIENT OUTREACH (OUTPATIENT)
Dept: EMERGENCY MEDICINE | Facility: HOSPITAL | Age: 38
End: 2023-04-20
Payer: MEDICAID

## 2023-04-21 ENCOUNTER — PATIENT OUTREACH (OUTPATIENT)
Dept: EMERGENCY MEDICINE | Facility: HOSPITAL | Age: 38
End: 2023-04-21
Payer: MEDICAID

## 2023-04-21 NOTE — PROGRESS NOTES
Joceline Fajardo  ED Navigator  Emergency Department    Project: [facility] [place]  Role: Community Health Worker    Date: 04/21/2023  Patient Name: Sarah Hernandez  MRN: [id number]  PCP: Camilla Moran NP    Assessment:     Sarah Hernandez is a 37 y.o. female who has presented to ED for ruptured ovarian cyst. Patient has visited the ED 2 times in the past 3 months. Patient did not contact PCP.     ED Navigator Initial Assessment    ED Navigator Enrollment Documentation  Consent to Services  Does patient consent to completing the assessment?: Yes  Contact  Method of Initial Contact: Phone  Transportation  Does the patient have issues with Transportation?: No  Does the patient have transportation to and from healthcare appointments?: Yes  Insurance Coverage  Do you have coverage/adequate coverage?: Yes  Type/kind of coverage: Medicaid/[employer]  Is patient able to afford co-pays/deductibles?: Yes  Is patient able to afford HME or supplies?: Yes  Does patient have an established [facility] PCP?: Yes  Able to access?: Yes  Does the patient have a lack of adequate coverage?: No  Specialist Appointment  Did the patient come to the ED to see a specialist?: No  Does the patient have a pending specialist referral?: Yes (Comment: OB/gyn)  Does the patient have a specialist appointment made?: No  PCP Follow Up Appointment  Has the patient had an appointment with a primary care provider in the past year?: Yes  Approximate date: 10/21/22  Provider: Camilla Moran  Does the patient have a follow up appontment with a PCP?: No  When was the last time you saw your PCP?: 10/21/22  Why does the patient not have a follow up scheduled?: Long wait times (Comment: Pt has long wait times for appts)  Medications  Is patient able to afford medication?: Yes  Is patient unable to get medication due to lack of transportation?: No  Psychological  Does the patient have psycho-social concerns?: Yes  What concerns does the patient have?: Other (see  comments), Anxiety and/or Depression (Comment: Pt states that she experiences high stress, anxiety and depression)  Food  Does the patient have concerns about food?: No  Communication/Education  Does the patient have limited English proficiency/English not primary language?: No  Does patient have low literacy and/or low health literacy?: Yes (Comment: low health literacy/frequent ED visits)  Does patient have concerns with care?: No  Does patient have dissatisfaction with care?: No  Other Financial Concerns  Does the patient have immediate financial distress?: No  Does the patient have general financial concerns?: No  Other Social Barriers/Concerns  Does the patient have any additional barriers or concerns?: Other (see comments) (Comment: High stress, anxiety and depression)  Primary Barrier  Barriers identified: Structural barrier (service availability, waiting times, etc.), Cognitive barrier (health literacy, language and communication, etc.), Psychological barrier (mistrust, anxiety, etc.) (Comment: low health literacy/frequent ED visits/long wait times for appts/high stress, anxiety and depression)  Root Cause of ED Utilization: Patient Knowledge/Low Health Literacy  Plan to address Patient Knowledge/Low Health Literacy: Provided information for [facility] 24/7 Nurse triage line [phone] or 1-[facility] ([phone])  Next steps: Provided Education  Was education/educational materials provided surrounding PCP services/creating a medical home?: Yes Was education verbal or written?: Written     Was education/educational materials provided surrounding low cost, healthy foods?: Yes Was education verbal or written?: Written     Was education/educational materials provided surrounding other items? If so, use comment to explain.: Yes (Comment: Urgent Care) Was education verbal or written?: Written   Plan: Provided information for [facility] 24/7 Nurse triage line, [phone] or [facility]  ([phone])  Expected Date of Follow Up 1: 5/16/23  Additional Documentation: I spoke with patient regarding ED visit on 4/18/23 for ruptured ovarian cyst. Pt states that she did not follow up with her pcp because she was told to see her OB/gyn for follow up. Pt was frustrated because she was told that she could not be seen until June because she was a new pt. Pt is in pain and needs immediate follow up. Pt accepted assistance with scheduling a follow up with her pcp as soon as possible. Pt states that her emotional state has been difficult with high stress, anxiety and depression, though she does have a good support system and sees family/friends daily. Pt states that she does not exercise regularly, does smoke 1 pack a day/ and does drink maybe 1 time a month 1 or 2 drinks at dinner. Pt states that she does not have any food or financial resource needs and does not have any additional resource needs at this time. Pt was provided resources for urgent care, stress, depression, along with [facility] form, [facility] Virtual Visit flyer, [facility] 24/[facility]#, [facility] Nurse Triage #, and Healthy Heart diet educational information.    Joceline Fajardo           Social History     Socioeconomic History    Marital status: Single   Tobacco Use    Smoking status: Every Day    Smokeless tobacco: Never   Substance and Sexual Activity    Alcohol use: Yes    Drug use: No     Social Determinants of Health     Financial Resource Strain: Low Risk     Difficulty of Paying Living Expenses: Not hard at all   Food Insecurity: No Food Insecurity    Worried About Running Out of Food in the Last Year: Never true    Ran Out of Food in the Last Year: Never true   Transportation Needs: No Transportation Needs    Lack of Transportation (Medical): No    Lack of Transportation (Non-Medical): No   Physical Activity: Inactive    Days of Exercise per Week: 0 days    Minutes of Exercise per Session: 0 min   Stress: Stress Concern  Present    Feeling of Stress : Very much   Social Connections: Socially Isolated    Frequency of Communication with Friends and Family: More than three times a week    Frequency of Social Gatherings with Friends and Family: More than three times a week    Attends [employer] Services: Never    Active Member of Clubs or Organizations: No    Attends Club or Organization Meetings: Never    Marital Status: Never    Housing Stability: Unknown    Unable to Pay for Housing in the Last Year: No    Unstable Housing in the Last Year: No       Plan:   I spoke with patient regarding ED visit on 4/18/23 for ruptured ovarian cyst. Pt states that she did not follow up with her pcp because she was told to see her OB/gyn for follow up. Pt was frustrated because she was told that she could not be seen until June because she was a new pt. Pt is in pain and needs immediate follow up. Pt accepted assistance with scheduling a follow up with her pcp as soon as possible. Pt states that her emotional state has been difficult with high stress, anxiety and depression, though she does have a good support system and sees family/friends daily. Pt states that she does not exercise regularly, does smoke 1 pack a day/ and does drink maybe 1 time a month 1 or 2 drinks at dinner. Pt states that she does not have any food or financial resource needs and does not have any additional resource needs at this time. Pt was provided resources for urgent care, stress, depression, along with Right Care Right Place form, [facility] Virtual Visit flyer, [facility] 24/[facility]#, [facility] Nurse Triage #, and Healthy Heart diet educational information.    Joceline Fajardo

## 2023-04-24 ENCOUNTER — PATIENT OUTREACH (OUTPATIENT)
Dept: EMERGENCY MEDICINE | Facility: HOSPITAL | Age: 38
End: 2023-04-24
Payer: MEDICAID

## 2023-04-25 ENCOUNTER — TELEPHONE (OUTPATIENT)
Dept: OBSTETRICS AND GYNECOLOGY | Facility: CLINIC | Age: 38
End: 2023-04-25
Payer: MEDICAID

## 2023-04-25 NOTE — TELEPHONE ENCOUNTER
Referral noted in workque from pt's recent er visit, pt contacted and initially scheduled for 5/12 with Lanie at [facility] as all Mds had no access until August. Also gave patient contact numbers for [facility], [facility] for sooner availability. Moments later, called patient back and advised our Pt access team could likely get her in sooner with a provider, informed her that 5/12 appt will be canceled for now as her referral is being transferred to a team equipped to accommodate her more timely. Pt verbalized understanding. Pt was given number to Pt access navigation team, [phone] and encouraged to call if she does not hear from them within the week. Also, encouraged pt to call us back if need be. Additionally, patient was instructed to return to ER for any sudden change in symptoms, increased or sever pain. Verbalized understanding.

## 2023-04-26 ENCOUNTER — PATIENT OUTREACH (OUTPATIENT)
Dept: EMERGENCY MEDICINE | Facility: HOSPITAL | Age: 38
End: 2023-04-26
Payer: MEDICAID

## 2023-04-28 ENCOUNTER — OFFICE VISIT (OUTPATIENT)
Dept: OBSTETRICS AND GYNECOLOGY | Facility: CLINIC | Age: 38
End: 2023-04-28
Payer: MEDICAID

## 2023-04-28 VITALS
WEIGHT: 235.81 LBS | BODY MASS INDEX: 41.78 KG/M2 | DIASTOLIC BLOOD PRESSURE: 113 MMHG | SYSTOLIC BLOOD PRESSURE: 157 MMHG | HEART RATE: 113 BPM | HEIGHT: 63 IN

## 2023-04-28 DIAGNOSIS — N83.209 RUPTURED OVARIAN CYST: ICD-10-CM

## 2023-04-28 DIAGNOSIS — N83.202 CYST OF LEFT OVARY: Primary | ICD-10-CM

## 2023-04-28 PROCEDURE — 99203 PR OFFICE/OUTPT VISIT, NEW, LEVL III, 30-44 MIN: ICD-10-PCS | Mod: S$PBB,,, | Performed by: OBSTETRICS & GYNECOLOGY

## 2023-04-28 PROCEDURE — 3080F DIAST BP >= 90 MM HG: CPT | Mod: CPTII,,, | Performed by: OBSTETRICS & GYNECOLOGY

## 2023-04-28 PROCEDURE — 1160F PR REVIEW ALL MEDS BY PRESCRIBER/CLIN PHARMACIST DOCUMENTED: ICD-10-PCS | Mod: CPTII,,, | Performed by: OBSTETRICS & GYNECOLOGY

## 2023-04-28 PROCEDURE — 4010F PR ACE/ARB THEARPY RXD/TAKEN: ICD-10-PCS | Mod: CPTII,,, | Performed by: OBSTETRICS & GYNECOLOGY

## 2023-04-28 PROCEDURE — 99999 PR PBB SHADOW E&M-EST. PATIENT-LVL III: ICD-10-PCS | Mod: PBBFAC,,, | Performed by: OBSTETRICS & GYNECOLOGY

## 2023-04-28 PROCEDURE — 3008F PR BODY MASS INDEX (BMI) DOCUMENTED: ICD-10-PCS | Mod: CPTII,,, | Performed by: OBSTETRICS & GYNECOLOGY

## 2023-04-28 PROCEDURE — 3008F BODY MASS INDEX DOCD: CPT | Mod: CPTII,,, | Performed by: OBSTETRICS & GYNECOLOGY

## 2023-04-28 PROCEDURE — 4010F ACE/ARB THERAPY RXD/TAKEN: CPT | Mod: CPTII,,, | Performed by: OBSTETRICS & GYNECOLOGY

## 2023-04-28 PROCEDURE — 99999 PR PBB SHADOW E&M-EST. PATIENT-LVL III: CPT | Mod: PBBFAC,,, | Performed by: OBSTETRICS & GYNECOLOGY

## 2023-04-28 PROCEDURE — 1159F PR MEDICATION LIST DOCUMENTED IN MEDICAL RECORD: ICD-10-PCS | Mod: CPTII,,, | Performed by: OBSTETRICS & GYNECOLOGY

## 2023-04-28 PROCEDURE — 1160F RVW MEDS BY RX/DR IN RCRD: CPT | Mod: CPTII,,, | Performed by: OBSTETRICS & GYNECOLOGY

## 2023-04-28 PROCEDURE — 99203 OFFICE O/P NEW LOW 30 MIN: CPT | Mod: S$PBB,,, | Performed by: OBSTETRICS & GYNECOLOGY

## 2023-04-28 PROCEDURE — 99213 OFFICE O/P EST LOW 20 MIN: CPT | Mod: PBBFAC,PO | Performed by: OBSTETRICS & GYNECOLOGY

## 2023-04-28 PROCEDURE — 3080F PR MOST RECENT DIASTOLIC BLOOD PRESSURE >= 90 MM HG: ICD-10-PCS | Mod: CPTII,,, | Performed by: OBSTETRICS & GYNECOLOGY

## 2023-04-28 PROCEDURE — 3077F SYST BP >= 140 MM HG: CPT | Mod: CPTII,,, | Performed by: OBSTETRICS & GYNECOLOGY

## 2023-04-28 PROCEDURE — 1159F MED LIST DOCD IN RCRD: CPT | Mod: CPTII,,, | Performed by: OBSTETRICS & GYNECOLOGY

## 2023-04-28 PROCEDURE — 3077F PR MOST RECENT SYSTOLIC BLOOD PRESSURE >= 140 MM HG: ICD-10-PCS | Mod: CPTII,,, | Performed by: OBSTETRICS & GYNECOLOGY

## 2023-04-28 RX ORDER — MEDROXYPROGESTERONE ACETATE 10 MG/1
10 TABLET ORAL NIGHTLY
Qty: 10 TABLET | Refills: 0 | Status: SHIPPED | OUTPATIENT
Start: 2023-04-28

## 2023-04-28 RX ORDER — CIPROFLOXACIN 500 MG/1
500 TABLET ORAL 2 TIMES DAILY
Qty: 14 TABLET | Refills: 0 | Status: SHIPPED | OUTPATIENT
Start: 2023-04-28 | End: 2023-05-05

## 2023-04-28 NOTE — PROGRESS NOTES
"37 y.o.   OB History    No obstetric history on file.       Comlaining of: pt here for fu cyst  Was seen in Ed x 2 for cyst, dx hemorrhagic cyst on L  Has mirena in now for about 2 years  Hx of irreg heavy cycles in past  Better with mirena    G0  Lives outside , could not find closer gyn in [place]         ROS:  GENERAL: No fever, chills, fatigability or weight loss.  SKIN: No rashes, itching or changes in color or texture of skin.  HEAD: No headaches or recent head trauma.  EYES: Visual acuity fine. No photophobia, ocular pain or diplopia.  EARS: Denies ear pain, discharge or vertigo.  NOSE: No loss of smell, no epistaxis or postnasal drip.  MOUTH & THROAT: No hoarseness or change in voice. No excessive gum bleeding.  NODES: Denies swollen glands.  CHEST: Denies CONWAY, cyanosis, wheezing, cough and sputum production.  CARDIOVASCULAR: Denies chest pain, PND, orthopnea or reduced exercise tolerance.  ABDOMEN: Appetite fine. No weight loss. Denies diarrhea, abdominal pain, hematemesis or blood in stool.  URINARY: No flank pain, dysuria or hematuria.  PERIPHERAL VASCULAR: No claudication or cyanosis.  MUSCULOSKELETAL: No joint stiffness or swelling. Denies back pain.  NEUROLOGIC: No history of seizures, paralysis, alteration of gait or coordination      PE: BP (!) 157/113   Pulse (!) 113   Ht 5' 3" (1.6 m)   Wt 107 kg (235 lb 12.8 oz)   LMP  (LMP Unknown)   BMI 41.77 kg/m²    Exam def    Discussed ovary function , cysts, size, blood etc  In general cyst less 5cm dont need surgery  Ct and us not typical for torsion, good flow to cyst  No bleeding now  Chart revd including lab tests, ct and us done at ED      A hemorrhagic cyst  Plan, need suppression, will start provera nightly  Rx cipro empiric  Plan on plevic us next Wednesday,pt will schedule  To compare with ed version  Pt has pain meds from primary  Pt agrees to plan   I spent a total of 30  minutes on the day of the visit.This includes face to face time and " non-face to face time preparing to see the patient (eg, review of tests), obtaining and/or reviewing separately obtained history, documenting clinical information in the electronic or other health record, independently interpreting results and communicating results to the patient/family/caregiver, or care coordinator

## 2023-05-03 ENCOUNTER — APPOINTMENT (OUTPATIENT)
Dept: RADIOLOGY | Facility: HOSPITAL | Age: 38
End: 2023-05-03
Attending: OBSTETRICS & GYNECOLOGY
Payer: MEDICAID

## 2023-05-03 DIAGNOSIS — N83.202 CYST OF LEFT OVARY: ICD-10-CM

## 2023-05-03 PROCEDURE — 76856 US EXAM PELVIC COMPLETE: CPT | Mod: 26,,, | Performed by: RADIOLOGY

## 2023-05-03 PROCEDURE — 76830 TRANSVAGINAL US NON-OB: CPT | Mod: 26,,, | Performed by: RADIOLOGY

## 2023-05-03 PROCEDURE — 76856 US PELVIS COMP WITH TRANSVAG NON-OB (XPD): ICD-10-PCS | Mod: 26,,, | Performed by: RADIOLOGY

## 2023-05-03 PROCEDURE — 76830 US PELVIS COMP WITH TRANSVAG NON-OB (XPD): ICD-10-PCS | Mod: 26,,, | Performed by: RADIOLOGY

## 2023-05-03 PROCEDURE — 76856 US EXAM PELVIC COMPLETE: CPT | Mod: TC,PO

## 2023-05-07 ENCOUNTER — PATIENT MESSAGE (OUTPATIENT)
Dept: OBSTETRICS AND GYNECOLOGY | Facility: CLINIC | Age: 38
End: 2023-05-07
Payer: MEDICAID

## 2023-05-07 NOTE — LETTER
May 15, 2023      [facility]  [address], [address]  [place] [address]  Phone: [phone]       Patient: Sarah Hernandez   YOB: 1985  Date of Visit: 05/15/2023    To Whom It May Concern:    Kenneth Hernandez  was at [facility] on 04/28/2023. The patient may return to work/school on 05/15/2023 with no restrictions. If you have any questions or concerns, or if I can be of further assistance, please do not hesitate to contact me.    Sincerely,    Michael Wiedemann, MD

## 2023-05-12 ENCOUNTER — TELEPHONE (OUTPATIENT)
Dept: OBSTETRICS AND GYNECOLOGY | Facility: CLINIC | Age: 38
End: 2023-05-12
Payer: MEDICAID

## 2023-05-12 NOTE — TELEPHONE ENCOUNTER
----- Message from Sree Ace MA sent at 5/12/2023  1:54 PM CDT -----    ----- Message -----  From: Toshia Monteiro  Sent: 5/12/2023   1:43 PM CDT  To: Wiedemann Michael A. Staff    .Type:  Needs Medical Advice    Who Called: pt  Would the patient rather a call back or a response via [facility]? call  Best Call Back Number: [phone]   Additional Information:     Pt called requesting to speak to Jolanta

## 2023-07-11 ENCOUNTER — PATIENT OUTREACH (OUTPATIENT)
Dept: EMERGENCY MEDICINE | Facility: HOSPITAL | Age: 38
End: 2023-07-11
Payer: MEDICAID

## 2025-02-17 ENCOUNTER — HOSPITAL ENCOUNTER (EMERGENCY)
Facility: HOSPITAL | Age: 40
Discharge: HOME | End: 2025-02-18
Attending: EMERGENCY MEDICINE
Payer: MEDICAID

## 2025-02-17 DIAGNOSIS — F12.90 MARIJUANA USE: ICD-10-CM

## 2025-02-17 DIAGNOSIS — E87.6 HYPOKALEMIA: ICD-10-CM

## 2025-02-17 DIAGNOSIS — R11.2 NAUSEA AND VOMITING, UNSPECIFIED VOMITING TYPE: Primary | ICD-10-CM

## 2025-02-17 LAB
ALBUMIN SERPL BCP-MCNC: 4.5 G/DL (ref 3.5–5.2)
ALP SERPL-CCNC: 105 U/L (ref 40–150)
ALT SERPL W/O P-5'-P-CCNC: 56 U/L (ref 10–44)
AMPHET+METHAMPHET UR QL: NEGATIVE
ANION GAP SERPL CALC-SCNC: 11 MMOL/L (ref 8–16)
ANION GAP SERPL CALC-SCNC: 16 MMOL/L (ref 8–16)
AST SERPL-CCNC: 28 U/L (ref 10–40)
B-HCG UR QL: NEGATIVE
BACTERIA #/AREA URNS HPF: ABNORMAL /HPF
BARBITURATES UR QL SCN>200 NG/ML: NEGATIVE
BASOPHILS # BLD AUTO: 0.08 K/UL (ref 0–0.2)
BASOPHILS NFR BLD: 0.5 % (ref 0–1.9)
BENZODIAZ UR QL SCN>200 NG/ML: NEGATIVE
BILIRUB SERPL-MCNC: 0.9 MG/DL (ref 0.1–1)
BILIRUB UR QL STRIP: NEGATIVE
BUN SERPL-MCNC: 6 MG/DL (ref 6–20)
BUN SERPL-MCNC: 7 MG/DL (ref 6–20)
BZE UR QL SCN: NEGATIVE
CALCIUM SERPL-MCNC: 8.4 MG/DL (ref 8.7–10.5)
CALCIUM SERPL-MCNC: 9.6 MG/DL (ref 8.7–10.5)
CANNABINOIDS UR QL SCN: ABNORMAL
CHLORIDE SERPL-SCNC: 104 MMOL/L (ref 95–110)
CHLORIDE SERPL-SCNC: 105 MMOL/L (ref 95–110)
CLARITY UR: CLEAR
CO2 SERPL-SCNC: 17 MMOL/L (ref 23–29)
CO2 SERPL-SCNC: 21 MMOL/L (ref 23–29)
COLOR UR: YELLOW
CREAT SERPL-MCNC: 0.8 MG/DL (ref 0.5–1.4)
CREAT SERPL-MCNC: 0.8 MG/DL (ref 0.5–1.4)
CREAT UR-MCNC: 304.9 MG/DL (ref 15–325)
DIFFERENTIAL METHOD BLD: ABNORMAL
EOSINOPHIL # BLD AUTO: 0.1 K/UL (ref 0–0.5)
EOSINOPHIL NFR BLD: 0.4 % (ref 0–8)
ERYTHROCYTE [DISTWIDTH] IN BLOOD BY AUTOMATED COUNT: 14.7 % (ref 11.5–14.5)
EST. GFR  (NO RACE VARIABLE): >60 ML/MIN/1.73 M^2
EST. GFR  (NO RACE VARIABLE): >60 ML/MIN/1.73 M^2
GLUCOSE SERPL-MCNC: 113 MG/DL (ref 70–110)
GLUCOSE SERPL-MCNC: 115 MG/DL (ref 70–110)
GLUCOSE UR QL STRIP: NEGATIVE
HCT VFR BLD AUTO: 41.7 % (ref 37–48.5)
HCV AB SERPL QL IA: NEGATIVE
HEP C VIRUS HOLD SPECIMEN: NORMAL
HGB BLD-MCNC: 15 G/DL (ref 12–16)
HGB UR QL STRIP: ABNORMAL
HIV 1+2 AB+HIV1 P24 AG SERPL QL IA: NEGATIVE
HYALINE CASTS #/AREA URNS LPF: 0 /LPF
IMM GRANULOCYTES # BLD AUTO: 0.13 K/UL (ref 0–0.04)
IMM GRANULOCYTES NFR BLD AUTO: 0.8 % (ref 0–0.5)
KETONES UR QL STRIP: ABNORMAL
LEUKOCYTE ESTERASE UR QL STRIP: NEGATIVE
LIPASE SERPL-CCNC: 11 U/L (ref 4–60)
LYMPHOCYTES # BLD AUTO: 2.5 K/UL (ref 1–4.8)
LYMPHOCYTES NFR BLD: 15.2 % (ref 18–48)
MCH RBC QN AUTO: 31.8 PG (ref 27–31)
MCHC RBC AUTO-ENTMCNC: 36 G/DL (ref 32–36)
MCV RBC AUTO: 88 FL (ref 82–98)
METHADONE UR QL SCN>300 NG/ML: NEGATIVE
MICROSCOPIC COMMENT: ABNORMAL
MONOCYTES # BLD AUTO: 1.2 K/UL (ref 0.3–1)
MONOCYTES NFR BLD: 7.2 % (ref 4–15)
NEUTROPHILS # BLD AUTO: 12.5 K/UL (ref 1.8–7.7)
NEUTROPHILS NFR BLD: 75.9 % (ref 38–73)
NITRITE UR QL STRIP: NEGATIVE
NRBC BLD-RTO: 0 /100 WBC
OPIATES UR QL SCN: ABNORMAL
PCP UR QL SCN>25 NG/ML: NEGATIVE
PH UR STRIP: 6 [PH] (ref 5–8)
PLATELET # BLD AUTO: 303 K/UL (ref 150–450)
PMV BLD AUTO: 10.7 FL (ref 9.2–12.9)
POTASSIUM SERPL-SCNC: 2.5 MMOL/L (ref 3.5–5.1)
POTASSIUM SERPL-SCNC: 2.9 MMOL/L (ref 3.5–5.1)
PROT SERPL-MCNC: 7.9 G/DL (ref 6–8.4)
PROT UR QL STRIP: ABNORMAL
RBC # BLD AUTO: 4.72 M/UL (ref 4–5.4)
RBC #/AREA URNS HPF: 5 /HPF (ref 0–4)
SODIUM SERPL-SCNC: 137 MMOL/L (ref 136–145)
SODIUM SERPL-SCNC: 137 MMOL/L (ref 136–145)
SP GR UR STRIP: 1.02 (ref 1–1.03)
SQUAMOUS #/AREA URNS HPF: 2 /HPF
TOXICOLOGY INFORMATION: ABNORMAL
UNIDENT CRYS URNS QL MICRO: ABNORMAL
URN SPEC COLLECT METH UR: ABNORMAL
UROBILINOGEN UR STRIP-ACNC: NEGATIVE EU/DL
WBC # BLD AUTO: 16.45 K/UL (ref 3.9–12.7)
WBC #/AREA URNS HPF: 1 /HPF (ref 0–5)

## 2025-02-17 PROCEDURE — 87389 HIV-1 AG W/HIV-1&-2 AB AG IA: CPT | Performed by: EMERGENCY MEDICINE

## 2025-02-17 PROCEDURE — 99285 EMERGENCY DEPT VISIT HI MDM: CPT | Mod: 25

## 2025-02-17 PROCEDURE — 85025 COMPLETE CBC W/AUTO DIFF WBC: CPT | Performed by: NURSE PRACTITIONER

## 2025-02-17 PROCEDURE — 81025 URINE PREGNANCY TEST: CPT | Performed by: NURSE PRACTITIONER

## 2025-02-17 PROCEDURE — 80307 DRUG TEST PRSMV CHEM ANLYZR: CPT | Performed by: EMERGENCY MEDICINE

## 2025-02-17 PROCEDURE — 25500020 PHARM REV CODE 255: Performed by: EMERGENCY MEDICINE

## 2025-02-17 PROCEDURE — 63600175 PHARM REV CODE 636 W HCPCS: Performed by: EMERGENCY MEDICINE

## 2025-02-17 PROCEDURE — 96365 THER/PROPH/DIAG IV INF INIT: CPT

## 2025-02-17 PROCEDURE — 63600175 PHARM REV CODE 636 W HCPCS: Performed by: NURSE PRACTITIONER

## 2025-02-17 PROCEDURE — 93005 ELECTROCARDIOGRAM TRACING: CPT

## 2025-02-17 PROCEDURE — 86803 HEPATITIS C AB TEST: CPT | Performed by: EMERGENCY MEDICINE

## 2025-02-17 PROCEDURE — 80053 COMPREHEN METABOLIC PANEL: CPT | Performed by: NURSE PRACTITIONER

## 2025-02-17 PROCEDURE — 96375 TX/PRO/DX INJ NEW DRUG ADDON: CPT

## 2025-02-17 PROCEDURE — 80048 BASIC METABOLIC PNL TOTAL CA: CPT | Mod: XB | Performed by: EMERGENCY MEDICINE

## 2025-02-17 PROCEDURE — 25000003 PHARM REV CODE 250: Performed by: EMERGENCY MEDICINE

## 2025-02-17 PROCEDURE — 96366 THER/PROPH/DIAG IV INF ADDON: CPT

## 2025-02-17 PROCEDURE — 83690 ASSAY OF LIPASE: CPT | Performed by: NURSE PRACTITIONER

## 2025-02-17 PROCEDURE — 81000 URINALYSIS NONAUTO W/SCOPE: CPT | Mod: 59 | Performed by: NURSE PRACTITIONER

## 2025-02-17 RX ORDER — POTASSIUM CHLORIDE 7.45 MG/ML
10 INJECTION INTRAVENOUS
Status: COMPLETED | OUTPATIENT
Start: 2025-02-17 | End: 2025-02-18

## 2025-02-17 RX ORDER — ONDANSETRON HYDROCHLORIDE 2 MG/ML
4 INJECTION, SOLUTION INTRAVENOUS
Status: COMPLETED | OUTPATIENT
Start: 2025-02-17 | End: 2025-02-17

## 2025-02-17 RX ORDER — MORPHINE SULFATE 4 MG/ML
4 INJECTION, SOLUTION INTRAMUSCULAR; INTRAVENOUS
Refills: 0 | Status: COMPLETED | OUTPATIENT
Start: 2025-02-17 | End: 2025-02-17

## 2025-02-17 RX ORDER — METOCLOPRAMIDE 5 MG/1
10 TABLET ORAL
Status: DISCONTINUED | OUTPATIENT
Start: 2025-02-17 | End: 2025-02-17

## 2025-02-17 RX ORDER — CLONIDINE HYDROCHLORIDE 0.1 MG/1
0.1 TABLET ORAL 2 TIMES DAILY
COMMUNITY

## 2025-02-17 RX ORDER — TOPIRAMATE 25 MG/1
25 TABLET ORAL 2 TIMES DAILY
COMMUNITY

## 2025-02-17 RX ORDER — POTASSIUM CHLORIDE 20 MEQ/1
40 TABLET, EXTENDED RELEASE ORAL
Status: COMPLETED | OUTPATIENT
Start: 2025-02-17 | End: 2025-02-17

## 2025-02-17 RX ORDER — POTASSIUM CHLORIDE 20 MEQ/1
40 TABLET, EXTENDED RELEASE ORAL
Status: DISCONTINUED | OUTPATIENT
Start: 2025-02-17 | End: 2025-02-18 | Stop reason: HOSPADM

## 2025-02-17 RX ORDER — TRAZODONE HYDROCHLORIDE 50 MG/1
50 TABLET ORAL NIGHTLY
COMMUNITY

## 2025-02-17 RX ORDER — METOPROLOL SUCCINATE 100 MG/1
100 TABLET, EXTENDED RELEASE ORAL DAILY
COMMUNITY

## 2025-02-17 RX ORDER — METOCLOPRAMIDE 5 MG/1
5 TABLET ORAL 4 TIMES DAILY
COMMUNITY

## 2025-02-17 RX ORDER — METOCLOPRAMIDE HYDROCHLORIDE 5 MG/ML
10 INJECTION INTRAMUSCULAR; INTRAVENOUS
Status: COMPLETED | OUTPATIENT
Start: 2025-02-17 | End: 2025-02-17

## 2025-02-17 RX ORDER — POTASSIUM CHLORIDE 7.45 MG/ML
10 INJECTION INTRAVENOUS
Status: COMPLETED | OUTPATIENT
Start: 2025-02-17 | End: 2025-02-17

## 2025-02-17 RX ADMIN — ONDANSETRON 4 MG: 2 INJECTION INTRAMUSCULAR; INTRAVENOUS at 09:02

## 2025-02-17 RX ADMIN — ONDANSETRON 4 MG: 2 INJECTION INTRAMUSCULAR; INTRAVENOUS at 03:02

## 2025-02-17 RX ADMIN — MORPHINE SULFATE 4 MG: 4 INJECTION INTRAVENOUS at 03:02

## 2025-02-17 RX ADMIN — POTASSIUM CHLORIDE 10 MEQ: 7.46 INJECTION, SOLUTION INTRAVENOUS at 11:02

## 2025-02-17 RX ADMIN — POTASSIUM CHLORIDE 40 MEQ: 1500 TABLET, EXTENDED RELEASE ORAL at 09:02

## 2025-02-17 RX ADMIN — SODIUM CHLORIDE 1000 ML: 0.9 INJECTION, SOLUTION INTRAVENOUS at 06:02

## 2025-02-17 RX ADMIN — METOCLOPRAMIDE 10 MG: 5 INJECTION, SOLUTION INTRAMUSCULAR; INTRAVENOUS at 06:02

## 2025-02-17 RX ADMIN — POTASSIUM CHLORIDE 10 MEQ: 7.46 INJECTION, SOLUTION INTRAVENOUS at 06:02

## 2025-02-17 RX ADMIN — MORPHINE SULFATE 4 MG: 4 INJECTION INTRAVENOUS at 09:02

## 2025-02-17 RX ADMIN — IOHEXOL 100 ML: 350 INJECTION, SOLUTION INTRAVENOUS at 08:02

## 2025-02-17 NOTE — FIRST PROVIDER EVALUATION
"Medical screening examination initiated.  I have conducted a focused provider triage encounter, findings are as follows:    Brief history of present illness:  reports recently seen at [facility] in [place]. Also reports headache     Vitals:    02/17/25 1347   BP: 139/88   BP Location: Right arm   Pulse: 101   Resp: 16   Temp: 98.7 °F (37.1 °C)   TempSrc: Oral   SpO2: 96%   Height: 5' 3" (1.6 m)       Pertinent physical exam:  nad    Brief workup plan:  labs, meds, further eval     Preliminary workup initiated; this workup will be continued and followed by the physician or advanced practice provider that is assigned to the patient when roomed.  "

## 2025-02-17 NOTE — ED PROVIDER NOTES
SCRIBE #1 NOTE: I, Dinorah Ojeda, am scribing for, and in the presence of, Marlon Hastings MD. I have scribed the entire note.    History     Chief Complaint   Patient presents with    Abdominal Pain     Pt states she's been having abd pain, N/V/D since Tuesday. Pt was seen at ED in [place] on Saturday and it helped some but she is still not able to keep any meds down.      Review of patient's allergies indicates:   Allergen Reactions    Antihistamines - alkylamine Anaphylaxis    Augmentin [amoxicillin-pot clavulanate] Nausea And Vomiting    Biaxin [clarithromycin] Nausea And Vomiting    Ceclor [cefaclor] Nausea And Vomiting    Ceftin [cefuroxime axetil] Nausea And Vomiting    Rocephin [ceftriaxone] Rash    Zithromax [azithromycin] Rash         History of Present Illness     HPI    2/17/2025, 4:19 PM  History obtained from the mother and patient      History of Present Illness: Sarah Hernandez is a 39 y.o. female patient with a PMHx of IIH who presents to the Emergency Department for evaluation of N/V which onset 1 week ago. Pt was [facility] at [facility] ED on 2/15/25 for the same sxs. Pt received Reglan with relief, but is unable to hold it down while at home. Symptoms are constant and moderate in severity. No mitigating or exacerbating factors reported. Associated sxs include abd pain, diarrhea, and a HA. Patient denies all other sxs at this time. Prior Tx includes Reglan and Zofran with relief while in hospital. Pt experienced similar sxs one other time after receiving her Cerebral angiogram at [place]. Pt was supposed to see her Neurologist today, but had to cancel due to her sxs. No further complaints or concerns at this time.       Arrival mode: Personal vehicle    PCP: Camilla Moran NP        Past Medical History:  History reviewed. No pertinent past medical history.    Past Surgical History:  Past Surgical History:   Procedure Laterality Date    APPENDECTOMY      BACK SURGERY      bone spur  removal    CHOLECYSTECTOMY      MANDIBLE SURGERY      SINUS SURGERY      TONSILLECTOMY      adenoids         Family History:  Family History   Problem Relation Name Age of Onset    Colon cancer Paternal Grandfather      Hypertension Father      Hyperlipidemia Father         Social History:  Social History     Tobacco Use    Smoking status: Every Day    Smokeless tobacco: Never   Substance and Sexual Activity    Alcohol use: Yes    Drug use: No    Sexual activity: Not on file        Review of Systems     Review of Systems   Gastrointestinal:  Positive for abdominal pain and diarrhea.   Neurological:  Positive for headaches.   All other systems reviewed and are negative.       Physical Exam     Initial Vitals [02/17/25 1347]   BP Pulse Resp Temp SpO2   139/88 101 16 98.7 °F (37.1 °C) 96 %      MAP       --          Physical Exam  Nursing Notes and Vital Signs Reviewed.  Constitutional: Patient is in no acute distress. Well-developed and well-nourished.Pt is overweight.  Head: Atraumatic. Normocephalic.  Eyes: PERRL. EOM intact. Conjunctivae are not pale. No scleral icterus.  ENT: Mucous membranes are dry. Oropharynx is clear and symmetric.   Neck: Supple. Full ROM. No lymphadenopathy.  Cardiovascular: Regular rate. Regular rhythm. No murmurs, rubs, or gallops. Pulmonary/Chest: No respiratory distress. Clear to auscultation bilaterally. No wheezing or rales.  Abdominal: Soft and non-distended.  There is no tenderness.  No rebound, guarding, or rigidity. Good bowel sounds.  Genitourinary: No CVA tenderness  Musculoskeletal: Moves all extremities. No obvious deformities. No edema. No calf tenderness.  Skin: Warm and dry.  Neurological:  Alert, awake, and appropriate.  Normal speech.  No acute focal neurological deficits are appreciated.  Psychiatric: Normal affect. Good eye contact. Appropriate in content.     ED Course   Critical Care    Date/Time: 2/18/2025 12:22 AM    Performed by: Marlon Hastings MD  Authorized  "by: Marlon Hastings MD  Direct patient critical care time: 18 minutes  Additional history critical care time: 9 minutes  Ordering / reviewing critical care time: 7 minutes  Documentation critical care time: 7 minutes  Total critical care time (exclusive of procedural time) : 41 minutes  Critical care time was exclusive of separately billable procedures and treating other patients and teaching time.  Critical care was necessary to treat or prevent imminent or life-threatening deterioration of the following conditions: hypokalemia.  Critical care was time spent personally by me on the following activities: blood draw for specimens, development of treatment plan with patient or surrogate, discussions with consultants, interpretation of cardiac output measurements, evaluation of patient's response to treatment, examination of patient, obtaining history from patient or surrogate, ordering and performing treatments and interventions, ordering and review of laboratory studies, ordering and review of radiographic studies, pulse oximetry, re-evaluation of patient's condition and review of old charts.        ED Vital Signs:  Vitals:    02/17/25 1347 02/17/25 1548 02/17/25 1800 02/17/25 1815   BP: 139/88  (!) 132/91    Pulse: 101  90 85   Resp: 16 18  (!) 23   Temp: 98.7 °F (37.1 °C)      TempSrc: Oral      SpO2: 96%  96% 96%   Height: 5' 3" (1.6 m)       02/17/25 1830 02/17/25 1948 02/17/25 2000 02/17/25 2100   BP: (!) 165/98  (!) 152/90 (!) 144/62   Pulse: 95 88 83 88   Resp: (!) 22 (!) 24 19 (!) 24   Temp:       TempSrc:       SpO2: 96% 97% 98% 95%   Height:        02/17/25 2122 02/17/25 2130 02/17/25 2230 02/17/25 2239   BP:  (!) 153/96 135/79    Pulse:  90 79 88   Resp: 18 19 14 (!) 32   Temp:       TempSrc:       SpO2:  97% 96% 97%   Height:        02/17/25 2300 02/18/25 0000   BP: 139/86 139/73   Pulse: 77 80   Resp: (!) 23 19   Temp:     TempSrc:     SpO2: 96% 95%   Height:         Abnormal Lab Results:  Labs " Reviewed   CBC W/ AUTO DIFFERENTIAL - Abnormal       Result Value    WBC 16.45 (*)     RBC 4.72      Hemoglobin 15.0      Hematocrit 41.7      MCV 88      MCH 31.8 (*)     MCHC 36.0      RDW 14.7 (*)     Platelets 303      MPV 10.7      Immature Granulocytes 0.8 (*)     Gran # (ANC) 12.5 (*)     Immature Grans (Abs) 0.13 (*)     Lymph # 2.5      Mono # 1.2 (*)     Eos # 0.1      Baso # 0.08      nRBC 0      Gran % 75.9 (*)     Lymph % 15.2 (*)     Mono % 7.2      Eosinophil % 0.4      Basophil % 0.5      Differential Method Automated      Narrative:     Release to patient->Immediate   COMPREHENSIVE METABOLIC PANEL - Abnormal    Sodium 137      Potassium 2.5 (*)     Chloride 104      CO2 17 (*)     Glucose 113 (*)     BUN 7      Creatinine 0.8      Calcium 9.6      Total Protein 7.9      Albumin 4.5      Total Bilirubin 0.9      Alkaline Phosphatase 105      AST 28      ALT 56 (*)     eGFR >60      Anion Gap 16      Narrative:     Release to patient->Immediate   K  critical result(s) called and verbal readback obtained from KARINA STREET RN by [email or web address] 02/17/2025 15:57   URINALYSIS, REFLEX TO URINE CULTURE - Abnormal    Specimen UA Urine, Clean Catch      Color, UA Yellow      Appearance, UA Clear      pH, UA 6.0      Specific Gravity, UA 1.020      Protein, UA 1+ (*)     Glucose, UA Negative      Ketones, UA 2+ (*)     Bilirubin (UA) Negative      Occult Blood UA 2+ (*)     Nitrite, UA Negative      Urobilinogen, UA Negative      Leukocytes, UA Negative      Narrative:     Specimen Source->Urine   DRUG SCREEN PANEL, URINE EMERGENCY - Abnormal    Benzodiazepines Negative      Methadone metabolites Negative      Cocaine (Metab.) Negative      Opiate Scrn, Ur Presumptive Positive (*)     Barbiturate Screen, Ur Negative      Amphetamine Screen, Ur Negative      THC Presumptive Positive (*)     Phencyclidine Negative      Creatinine, Urine 304.9      Toxicology Information SEE COMMENT      Narrative:     Specimen  Source->Urine   URINALYSIS MICROSCOPIC - Abnormal    RBC, UA 5 (*)     WBC, UA 1      Bacteria Occasional      Squam Epithel, UA 2      Hyaline Casts, UA 0      Unclass Orin UA Occasional      Microscopic Comment SEE COMMENT      Narrative:     Specimen Source->Urine   BASIC METABOLIC PANEL - Abnormal    Sodium 137      Potassium 2.9 (*)     Chloride 105      CO2 21 (*)     Glucose 115 (*)     BUN 6      Creatinine 0.8      Calcium 8.4 (*)     Anion Gap 11      eGFR >60     HEPATITIS C ANTIBODY    Hepatitis C Ab Negative      Narrative:     Release to patient->Immediate   HEP C VIRUS HOLD SPECIMEN    HEP C Virus Hold Specimen Hold for HCV sendout      Narrative:     Release to patient->Immediate   HIV 1 / 2 ANTIBODY    HIV 1/2 Ag/Ab Negative      Narrative:     Release to patient->Immediate   LIPASE    Lipase 11      Narrative:     Release to patient->Immediate   PREGNANCY TEST, URINE RAPID    Preg Test, Ur Negative      Narrative:     Specimen Source->Urine        All Lab Results:  Results for orders placed or performed during the hospital encounter of 02/17/25   Hepatitis C Antibody    Collection Time: 02/17/25  3:28 PM   Result Value Ref Range    Hepatitis C Ab Negative Negative   HCV Virus Hold Specimen    Collection Time: 02/17/25  3:28 PM   Result Value Ref Range    HEP C Virus Hold Specimen Hold for HCV sendout    HIV 1/2 Ag/Ab (4th Gen)    Collection Time: 02/17/25  3:28 PM   Result Value Ref Range    HIV 1/2 Ag/Ab Negative Negative   CBC auto differential    Collection Time: 02/17/25  3:28 PM   Result Value Ref Range    WBC 16.45 (H) 3.90 - 12.70 K/uL    RBC 4.72 4.00 - 5.40 M/uL    Hemoglobin 15.0 12.0 - 16.0 g/dL    Hematocrit 41.7 37.0 - 48.5 %    MCV 88 82 - 98 fL    MCH 31.8 (H) 27.0 - 31.0 pg    MCHC 36.0 32.0 - 36.0 g/dL    RDW 14.7 (H) 11.5 - 14.5 %    Platelets 303 150 - 450 K/uL    MPV 10.7 9.2 - 12.9 fL    Immature Granulocytes 0.8 (H) 0.0 - 0.5 %    Gran # (ANC) 12.5 (H) 1.8 - 7.7 K/uL     Immature Grans (Abs) 0.13 (H) 0.00 - 0.04 K/uL    Lymph # 2.5 1.0 - 4.8 K/uL    Mono # 1.2 (H) 0.3 - 1.0 K/uL    Eos # 0.1 0.0 - 0.5 K/uL    Baso # 0.08 0.00 - 0.20 K/uL    nRBC 0 0 /100 WBC    Gran % 75.9 (H) 38.0 - 73.0 %    Lymph % 15.2 (L) 18.0 - 48.0 %    Mono % 7.2 4.0 - 15.0 %    Eosinophil % 0.4 0.0 - 8.0 %    Basophil % 0.5 0.0 - 1.9 %    Differential Method Automated    Comprehensive metabolic panel    Collection Time: 02/17/25  3:28 PM   Result Value Ref Range    Sodium 137 136 - 145 mmol/L    Potassium 2.5 (LL) 3.5 - 5.1 mmol/L    Chloride 104 95 - 110 mmol/L    CO2 17 (L) 23 - 29 mmol/L    Glucose 113 (H) 70 - 110 mg/dL    BUN 7 6 - 20 mg/dL    Creatinine 0.8 0.5 - 1.4 mg/dL    Calcium 9.6 8.7 - 10.5 mg/dL    Total Protein 7.9 6.0 - 8.4 g/dL    Albumin 4.5 3.5 - 5.2 g/dL    Total Bilirubin 0.9 0.1 - 1.0 mg/dL    Alkaline Phosphatase 105 40 - 150 U/L    AST 28 10 - 40 U/L    ALT 56 (H) 10 - 44 U/L    eGFR >60 >60 mL/min/1.73 m^2    Anion Gap 16 8 - 16 mmol/L   Lipase    Collection Time: 02/17/25  3:28 PM   Result Value Ref Range    Lipase 11 4 - 60 U/L   Urinalysis, Reflex to Urine Culture Urine, Clean Catch    Collection Time: 02/17/25  6:07 PM    Specimen: Urine   Result Value Ref Range    Specimen UA Urine, Clean Catch     Color, UA Yellow Yellow, Straw, Zeinab    Appearance, UA Clear Clear    pH, UA 6.0 5.0 - 8.0    Specific Gravity, UA 1.020 1.005 - 1.030    Protein, UA 1+ (A) Negative    Glucose, UA Negative Negative    Ketones, UA 2+ (A) Negative    Bilirubin (UA) Negative Negative    Occult Blood UA 2+ (A) Negative    Nitrite, UA Negative Negative    Urobilinogen, UA Negative <2.0 EU/dL    Leukocytes, UA Negative Negative   Pregnancy, urine rapid (UPT)    Collection Time: 02/17/25  6:07 PM   Result Value Ref Range    Preg Test, Ur Negative    Drug screen panel, emergency    Collection Time: 02/17/25  6:07 PM   Result Value Ref Range    Benzodiazepines Negative Negative    Methadone metabolites  Negative Negative    Cocaine (Metab.) Negative Negative    Opiate Scrn, Ur Presumptive Positive (A) Negative    Barbiturate Screen, Ur Negative Negative    Amphetamine Screen, Ur Negative Negative    THC Presumptive Positive (A) Negative    Phencyclidine Negative Negative    Creatinine, Urine 304.9 15.0 - 325.0 mg/dL    Toxicology Information SEE COMMENT    Urinalysis Microscopic    Collection Time: 02/17/25  6:07 PM   Result Value Ref Range    RBC, UA 5 (H) 0 - 4 /hpf    WBC, UA 1 0 - 5 /hpf    Bacteria Occasional None-Occ /hpf    Squam Epithel, UA 2 /hpf    Hyaline Casts, UA 0 0-1/lpf /lpf    Unclass Orin UA Occasional None-Moderate    Microscopic Comment SEE COMMENT    Basic metabolic panel    Collection Time: 02/17/25 10:36 PM   Result Value Ref Range    Sodium 137 136 - 145 mmol/L    Potassium 2.9 (L) 3.5 - 5.1 mmol/L    Chloride 105 95 - 110 mmol/L    CO2 21 (L) 23 - 29 mmol/L    Glucose 115 (H) 70 - 110 mg/dL    BUN 6 6 - 20 mg/dL    Creatinine 0.8 0.5 - 1.4 mg/dL    Calcium 8.4 (L) 8.7 - 10.5 mg/dL    Anion Gap 11 8 - 16 mmol/L    eGFR >60 >60 mL/min/1.73 m^2         Imaging Results:  Imaging Results              CT Abdomen Pelvis With IV Contrast Routine Oral Contrast (Final result)  Result time 02/17/25 20:48:09      Final result by Osmin Morgan MD (02/17/25 20:48:09)                   Impression:      No acute process.  Right ovarian follicle.  IUD in place.  Fatty infiltration of liver.  Recommend follow-up if symptoms persist cyst    All CT scans at this facility use dose modulation, iterative reconstruction, and/or weight based dosing when appropriate to reduce radiation dose to as low as reasonably achievable.      Electronically signed by: Osmin Morgan  Date:    02/17/2025  Time:    20:48               Narrative:    EXAMINATION:  CT ABDOMEN PELVIS WITH IV CONTRAST    CLINICAL HISTORY:  Abdominal abscess/infection suspected;    TECHNIQUE:  Low dose axial images, sagittal and coronal reformations  were obtained from the lung bases to the pubic symphysis following the IV administration of 100 mL of Omnipaque 350.    COMPARISON:  None    FINDINGS:  Heart: Normal size as far as seen. No effusion as far as seen.    Lung Bases: Clear.    Liver: Fatty infiltration of the liver..  No focal lesions.    Gallbladder: Status post cholecystectomy    Bile Ducts: No dilatation.    Pancreas: No mass. No peripancreatic fat stranding.    Spleen: Normal.    Adrenals: Normal.    Kidneys/Ureters: Normal enhancement.  No mass or  hydroureteronephrosis.    Bladder: No wall thickening.    Reproductive organs: IUD device in place.  Right ovarian follicle.    GI Tract/Mesentery: No evidence of bowel obstruction or inflammation.  No evidence of acute appendicitis.    Peritoneal Space: No ascites or free air.    Retroperitoneum: No significant adenopathy.    Abdominal wall: Normal.    Vasculature: No aneurysm.    Bones: No acute fracture. No suspicious lytic or sclerotic lesions.                                       The EKG was ordered, reviewed, and independently interpreted by the ED provider.  Interpretation time: 16:31  Rate: 89 BPM  Rhythm: normal sinus rhythm  Interpretation: ST and T wave abnormality, consider inferior ischemia  ST and T wave abnormality, consider anterolateral ischemia . No STEMI.           The Emergency Provider reviewed the vital signs and test results, which are outlined above.     ED Discussion       12:18 AM: pt's potassium raised from 2.5 to 2.9 after 10 mEq IV. Pt was given a second 10 mEq IV.    12:20 AM: Reassessed pt at this time. Discussed with patient and/or family/caretaker all pertinent ED information and results. Discussed pt dx and plan of tx. Gave patient all f/u and return to the ED instructions. All questions and concerns were addressed at this time. Patient and/or family/caretaker expresses understanding of information and instructions, and is comfortable with plan to discharge. Pt is  stable for discharge.     I discussed with patient and/or family/caretaker that evaluation in the ED does not suggest any emergent or life threatening medical conditions requiring immediate intervention beyond what was provided in the ED, and I believe patient is safe for discharge.  Regardless, an unremarkable evaluation in the ED does not preclude the development or presence of a serious of life threatening condition. As such, I instructed that the patient is to return immediately for any worsening or change in current symptoms.       Medical Decision Making  Differential Diagnosis: Gastroenteritis, bowel obstruction, colitis, diverticulitis, cholecystitis, appendicitis, perforated bowel, herniation, infectious etiology, UTI, pyelonephritis, biliary obstruction, lower lobe pneumonia      Amount and/or Complexity of Data Reviewed  Labs: ordered. Decision-making details documented in ED Course.  Radiology: ordered. Decision-making details documented in ED Course.  ECG/medicine tests: ordered and independent interpretation performed. Decision-making details documented in ED Course.    Risk  Prescription drug management.    Critical Care  Total time providing critical care: 41 minutes                ED Medication(s):  Medications   potassium chloride SA CR tablet 40 mEq (40 mEq Oral Not Given 2/17/25 1630)   sodium chloride 0.9% bolus 1,000 mL 1,000 mL (0 mLs Intravenous Stopped 2/17/25 2217)   iohexol (Omnipaque 350) oral solution 30 mL (has no administration in time range)   morphine injection 4 mg (4 mg Intravenous Given 2/17/25 1548)   ondansetron injection 4 mg (4 mg Intravenous Given 2/17/25 1549)   potassium chloride 10 mEq in 100 mL IVPB (0 mEq Intravenous Stopped 2/17/25 1900)   metoclopramide injection 10 mg (10 mg Intravenous Given 2/17/25 1822)   sodium chloride 0.9% bolus 1,000 mL 1,000 mL (0 mLs Intravenous Stopped 2/17/25 1915)   iohexoL (OMNIPAQUE 350) injection 100 mL (100 mLs Intravenous Given  2/17/25 2025)   morphine injection 4 mg (4 mg Intravenous Given 2/17/25 2122)   ondansetron injection 4 mg (4 mg Intravenous Given 2/17/25 2122)   potassium chloride SA CR tablet 40 mEq (40 mEq Oral Given 2/17/25 2145)   potassium chloride 10 mEq in 100 mL IVPB (0 mEq Intravenous Stopped 2/18/25 0020)       Discharge Medication List as of 2/18/2025 12:22 AM        START taking these medications    Details   potassium chloride 10% (KAYCIEL) 20 mEq/15 mL oral solution Take 30 mLs (40 mEq total) by mouth once daily. for 14 days, Starting Tue 2/18/2025, Until Tue 3/4/2025, Normal              Follow-up Information       Camilla Moran, JEROME. Schedule an appointment as soon as possible for a visit in 2 days.    Specialty: Family Medicine  Contact information:  [address].  [address]  [facility] [facility] [address]  [phone]                                 Scribe Attestation:   Scribe #1: I performed the above scribed service and the documentation accurately describes the services I performed. I attest to the accuracy of the note.     Attending:   Physician Attestation Statement for Scribe #1: I, Marlon Hastings MD, personally performed the services described in this documentation, as scribed by Dinorah Ojeda, in my presence, and it is both accurate and complete.           Clinical Impression       ICD-10-CM ICD-9-CM   1. Nausea and vomiting, unspecified vomiting type  R11.2 787.01   2. Hypokalemia  E87.6 276.8   3. Marijuana use  F12.90 305.20       Disposition:   Disposition: Discharged  Condition: Stable        Marlon Hastings MD  02/18/25 0037

## 2025-02-18 VITALS
HEIGHT: 63 IN | BODY MASS INDEX: 41.77 KG/M2 | DIASTOLIC BLOOD PRESSURE: 73 MMHG | OXYGEN SATURATION: 95 % | SYSTOLIC BLOOD PRESSURE: 139 MMHG | TEMPERATURE: 99 F | HEART RATE: 80 BPM | RESPIRATION RATE: 19 BRPM

## 2025-02-18 LAB
OHS QRS DURATION: 96 MS
OHS QTC CALCULATION: 442 MS

## 2025-02-18 RX ORDER — POTASSIUM CHLORIDE ORAL 1.5 G/15ML
40 SOLUTION ORAL DAILY
Qty: 473 ML | Refills: 0 | Status: SHIPPED | OUTPATIENT
Start: 2025-02-18 | End: 2025-03-04

## 2025-04-16 NOTE — Clinical Note
"Sarah"Nicole Hernandez was seen and treated in our emergency department on 4/16/2023.  She may return to work on 04/19/2023.       If you have any questions or concerns, please don't hesitate to call.      Octavio Jimenez NP" Cami-  Pt is scheduled May 1st and July 9th.  What's the problem?